# Patient Record
Sex: FEMALE | Race: WHITE | NOT HISPANIC OR LATINO
[De-identification: names, ages, dates, MRNs, and addresses within clinical notes are randomized per-mention and may not be internally consistent; named-entity substitution may affect disease eponyms.]

---

## 2017-04-05 ENCOUNTER — APPOINTMENT (OUTPATIENT)
Dept: OTOLARYNGOLOGY | Facility: CLINIC | Age: 67
End: 2017-04-05

## 2017-04-05 VITALS
DIASTOLIC BLOOD PRESSURE: 85 MMHG | HEIGHT: 62 IN | BODY MASS INDEX: 24.29 KG/M2 | SYSTOLIC BLOOD PRESSURE: 127 MMHG | WEIGHT: 132 LBS | HEART RATE: 75 BPM

## 2017-04-05 DIAGNOSIS — F45.8 OTHER SOMATOFORM DISORDERS: ICD-10-CM

## 2017-04-05 DIAGNOSIS — J35.1 HYPERTROPHY OF TONSILS: ICD-10-CM

## 2017-04-05 DIAGNOSIS — Z87.891 PERSONAL HISTORY OF NICOTINE DEPENDENCE: ICD-10-CM

## 2017-04-05 DIAGNOSIS — J06.9 ACUTE UPPER RESPIRATORY INFECTION, UNSPECIFIED: ICD-10-CM

## 2017-04-06 PROBLEM — J35.1 HYPERTROPHY OF LINGUAL TONSIL: Status: ACTIVE | Noted: 2017-04-06

## 2017-05-09 ENCOUNTER — RX RENEWAL (OUTPATIENT)
Age: 67
End: 2017-05-09

## 2017-12-01 ENCOUNTER — RX RENEWAL (OUTPATIENT)
Age: 67
End: 2017-12-01

## 2018-07-26 PROBLEM — F45.8 BRUXISM (TEETH GRINDING): Status: ACTIVE | Noted: 2017-04-05

## 2018-10-17 ENCOUNTER — APPOINTMENT (OUTPATIENT)
Dept: INTERNAL MEDICINE | Facility: CLINIC | Age: 68
End: 2018-10-17
Payer: MEDICARE

## 2018-10-17 VITALS — SYSTOLIC BLOOD PRESSURE: 118 MMHG | OXYGEN SATURATION: 98 % | HEART RATE: 84 BPM | DIASTOLIC BLOOD PRESSURE: 83 MMHG

## 2018-10-17 PROCEDURE — 99213 OFFICE O/P EST LOW 20 MIN: CPT

## 2018-10-17 RX ORDER — AMITRIPTYLINE HYDROCHLORIDE 25 MG/1
25 TABLET, FILM COATED ORAL
Refills: 0 | Status: DISCONTINUED | COMMUNITY
End: 2018-10-17

## 2018-10-17 RX ORDER — FAMOTIDINE 10 MG/1
TABLET, FILM COATED ORAL
Refills: 0 | Status: DISCONTINUED | COMMUNITY
End: 2018-10-17

## 2018-10-17 NOTE — HISTORY OF PRESENT ILLNESS
[FreeTextEntry1] : Severe anxiety;  Would like to come off Valium;  [de-identified] :  As outlined above; Also severe diaphoresis during sleep; Has been present over two decades; Recent mammogram negative. \par Does not want Physical done;

## 2019-01-04 ENCOUNTER — APPOINTMENT (OUTPATIENT)
Dept: INTERNAL MEDICINE | Facility: CLINIC | Age: 69
End: 2019-01-04

## 2019-01-04 ENCOUNTER — APPOINTMENT (OUTPATIENT)
Dept: INTERNAL MEDICINE | Facility: CLINIC | Age: 69
End: 2019-01-04
Payer: MEDICARE

## 2019-01-04 VITALS
BODY MASS INDEX: 24.84 KG/M2 | HEART RATE: 77 BPM | OXYGEN SATURATION: 97 % | SYSTOLIC BLOOD PRESSURE: 133 MMHG | TEMPERATURE: 99.2 F | WEIGHT: 135 LBS | DIASTOLIC BLOOD PRESSURE: 86 MMHG | HEIGHT: 62 IN

## 2019-01-04 DIAGNOSIS — K21.0 GASTRO-ESOPHAGEAL REFLUX DISEASE WITH ESOPHAGITIS: ICD-10-CM

## 2019-01-04 DIAGNOSIS — R42 DIZZINESS AND GIDDINESS: ICD-10-CM

## 2019-01-04 PROCEDURE — 99213 OFFICE O/P EST LOW 20 MIN: CPT | Mod: 25

## 2019-01-04 PROCEDURE — 36415 COLL VENOUS BLD VENIPUNCTURE: CPT

## 2019-01-04 NOTE — ASSESSMENT
[FreeTextEntry1] : Unclear reason for lightheadedness. Will obtain blood work and EKG; No change in medications for now. There is a tremendous component of anxiety in her symptoms which she understands. Will review blood work and make further recommendations

## 2019-01-06 LAB
25(OH)D3 SERPL-MCNC: 72 NG/ML
ALBUMIN SERPL ELPH-MCNC: 4.4 G/DL
ALP BLD-CCNC: 52 U/L
ALT SERPL-CCNC: 16 U/L
ANION GAP SERPL CALC-SCNC: 12 MMOL/L
APPEARANCE: CLEAR
AST SERPL-CCNC: 30 U/L
BACTERIA: NEGATIVE
BASOPHILS # BLD AUTO: 0.05 K/UL
BASOPHILS NFR BLD AUTO: 0.8 %
BILIRUB SERPL-MCNC: 0.5 MG/DL
BILIRUBIN URINE: NEGATIVE
BLOOD URINE: NEGATIVE
BUN SERPL-MCNC: 12 MG/DL
CALCIUM SERPL-MCNC: 9.3 MG/DL
CHLORIDE SERPL-SCNC: 104 MMOL/L
CHOLEST SERPL-MCNC: 240 MG/DL
CHOLEST/HDLC SERPL: 3 RATIO
CO2 SERPL-SCNC: 23 MMOL/L
COLOR: ABNORMAL
CREAT SERPL-MCNC: 0.84 MG/DL
EOSINOPHIL # BLD AUTO: 0.21 K/UL
EOSINOPHIL NFR BLD AUTO: 3.2 %
GLUCOSE QUALITATIVE U: NEGATIVE MG/DL
GLUCOSE SERPL-MCNC: 82 MG/DL
HBA1C MFR BLD HPLC: 5.4 %
HCT VFR BLD CALC: 35.6 %
HDLC SERPL-MCNC: 80 MG/DL
HGB BLD-MCNC: 11.6 G/DL
HYALINE CASTS: 2 /LPF
IMM GRANULOCYTES NFR BLD AUTO: 0 %
KETONES URINE: NEGATIVE
LDLC SERPL CALC-MCNC: 144 MG/DL
LEUKOCYTE ESTERASE URINE: NEGATIVE
LYMPHOCYTES # BLD AUTO: 2.18 K/UL
LYMPHOCYTES NFR BLD AUTO: 33.4 %
MAN DIFF?: NORMAL
MCHC RBC-ENTMCNC: 32.6 GM/DL
MCHC RBC-ENTMCNC: 33 PG
MCV RBC AUTO: 101.4 FL
MICROSCOPIC-UA: NORMAL
MONOCYTES # BLD AUTO: 0.49 K/UL
MONOCYTES NFR BLD AUTO: 7.5 %
NEUTROPHILS # BLD AUTO: 3.6 K/UL
NEUTROPHILS NFR BLD AUTO: 55.1 %
NITRITE URINE: NEGATIVE
PH URINE: 6.5
PLATELET # BLD AUTO: 265 K/UL
POTASSIUM SERPL-SCNC: 4.7 MMOL/L
PROT SERPL-MCNC: 7 G/DL
PROTEIN URINE: NEGATIVE MG/DL
RBC # BLD: 3.51 M/UL
RBC # FLD: 15 %
RED BLOOD CELLS URINE: 11 /HPF
SODIUM SERPL-SCNC: 139 MMOL/L
SPECIFIC GRAVITY URINE: 1.02
SQUAMOUS EPITHELIAL CELLS: 6 /HPF
T4 FREE SERPL-MCNC: 0.9 NG/DL
TRIGL SERPL-MCNC: 79 MG/DL
TSH SERPL-ACNC: 2.02 UIU/ML
UROBILINOGEN URINE: NEGATIVE MG/DL
WBC # FLD AUTO: 6.53 K/UL
WHITE BLOOD CELLS URINE: 1 /HPF

## 2019-05-08 ENCOUNTER — APPOINTMENT (OUTPATIENT)
Dept: INTERNAL MEDICINE | Facility: CLINIC | Age: 69
End: 2019-05-08
Payer: MEDICARE

## 2019-05-08 VITALS
WEIGHT: 135 LBS | HEART RATE: 83 BPM | BODY MASS INDEX: 24.84 KG/M2 | SYSTOLIC BLOOD PRESSURE: 114 MMHG | HEIGHT: 62 IN | OXYGEN SATURATION: 97 % | TEMPERATURE: 98.7 F | DIASTOLIC BLOOD PRESSURE: 77 MMHG

## 2019-05-08 DIAGNOSIS — G47.00 INSOMNIA, UNSPECIFIED: ICD-10-CM

## 2019-05-08 PROCEDURE — 36415 COLL VENOUS BLD VENIPUNCTURE: CPT

## 2019-05-08 PROCEDURE — 99213 OFFICE O/P EST LOW 20 MIN: CPT | Mod: 25

## 2019-05-08 NOTE — HISTORY OF PRESENT ILLNESS
[de-identified] : As above will repeat lipid profile today; Still with diaphoresis at night; Being followed By Dr. Rajiv Jenkins  re hand problems; [FreeTextEntry1] : Labs from last visit; Increased lipids;

## 2019-05-08 NOTE — PHYSICAL EXAM
[Well Nourished] : well nourished [No Acute Distress] : no acute distress [Well Developed] : well developed [Well-Appearing] : well-appearing [Normal Sclera/Conjunctiva] : normal sclera/conjunctiva [PERRL] : pupils equal round and reactive to light [EOMI] : extraocular movements intact [Normal Outer Ear/Nose] : the outer ears and nose were normal in appearance [Normal Oropharynx] : the oropharynx was normal [No JVD] : no jugular venous distention [No Lymphadenopathy] : no lymphadenopathy [Supple] : supple [Thyroid Normal, No Nodules] : the thyroid was normal and there were no nodules present [No Respiratory Distress] : no respiratory distress  [Clear to Auscultation] : lungs were clear to auscultation bilaterally [Normal Rate] : normal rate  [No Accessory Muscle Use] : no accessory muscle use [Regular Rhythm] : with a regular rhythm [Normal S1, S2] : normal S1 and S2 [No Murmur] : no murmur heard [No Carotid Bruits] : no carotid bruits [No Abdominal Bruit] : a ~M bruit was not heard ~T in the abdomen [No Varicosities] : no varicosities [No Edema] : there was no peripheral edema [Pedal Pulses Present] : the pedal pulses are present [Soft] : abdomen soft [No Extremity Clubbing/Cyanosis] : no extremity clubbing/cyanosis [No Palpable Aorta] : no palpable aorta [Non-distended] : non-distended [No Masses] : no abdominal mass palpated [Non Tender] : non-tender [Normal Posterior Cervical Nodes] : no posterior cervical lymphadenopathy [Normal Bowel Sounds] : normal bowel sounds [No HSM] : no HSM [No Spinal Tenderness] : no spinal tenderness [Normal Anterior Cervical Nodes] : no anterior cervical lymphadenopathy [No CVA Tenderness] : no CVA  tenderness [No Rash] : no rash [No Joint Swelling] : no joint swelling [Grossly Normal Strength/Tone] : grossly normal strength/tone [Normal Gait] : normal gait [No Focal Deficits] : no focal deficits [Coordination Grossly Intact] : coordination grossly intact [Deep Tendon Reflexes (DTR)] : deep tendon reflexes were 2+ and symmetric [Normal Insight/Judgement] : insight and judgment were intact [Normal Affect] : the affect was normal

## 2019-05-08 NOTE — HEALTH RISK ASSESSMENT
[No falls in past year] : Patient reported no falls in the past year [0] : 2) Feeling down, depressed, or hopeless: Not at all (0) [[id number]] : 0 [] : No

## 2019-05-11 LAB
CHOLEST SERPL-MCNC: 225 MG/DL
CHOLEST/HDLC SERPL: 2.9 RATIO
HDLC SERPL-MCNC: 79 MG/DL
LDLC SERPL CALC-MCNC: 132 MG/DL
TRIGL SERPL-MCNC: 71 MG/DL

## 2019-05-22 ENCOUNTER — APPOINTMENT (OUTPATIENT)
Dept: INTERNAL MEDICINE | Facility: CLINIC | Age: 69
End: 2019-05-22
Payer: MEDICARE

## 2019-05-22 VITALS
SYSTOLIC BLOOD PRESSURE: 127 MMHG | TEMPERATURE: 98.7 F | WEIGHT: 135 LBS | OXYGEN SATURATION: 97 % | BODY MASS INDEX: 24.84 KG/M2 | HEIGHT: 62 IN | HEART RATE: 78 BPM | DIASTOLIC BLOOD PRESSURE: 85 MMHG

## 2019-05-22 DIAGNOSIS — F32.9 MAJOR DEPRESSIVE DISORDER, SINGLE EPISODE, UNSPECIFIED: ICD-10-CM

## 2019-05-22 PROCEDURE — 99213 OFFICE O/P EST LOW 20 MIN: CPT | Mod: 25

## 2019-05-22 PROCEDURE — 36415 COLL VENOUS BLD VENIPUNCTURE: CPT

## 2019-05-22 RX ORDER — PANTOPRAZOLE 40 MG/1
40 TABLET, DELAYED RELEASE ORAL
Refills: 0 | Status: DISCONTINUED | COMMUNITY
End: 2019-05-22

## 2019-05-22 RX ORDER — OXYCODONE 5 MG/1
5 TABLET ORAL
Qty: 20 | Refills: 0 | Status: ACTIVE | COMMUNITY
Start: 2019-04-22

## 2019-05-22 RX ORDER — CLINDAMYCIN HYDROCHLORIDE 150 MG/1
150 CAPSULE ORAL
Qty: 20 | Refills: 0 | Status: ACTIVE | COMMUNITY
Start: 2019-05-14

## 2019-05-22 NOTE — HISTORY OF PRESENT ILLNESS
[No Pertinent Cardiac History] : no history of aortic stenosis, atrial fibrillation, coronary artery disease, recent myocardial infarction, or implantable device/pacemaker [No Pertinent Pulmonary History] : no history of asthma, COPD, sleep apnea, or smoking [Chronic Anticoagulation] : no chronic anticoagulation [Chronic Kidney Disease] : no chronic kidney disease [Diabetes] : no diabetes [FreeTextEntry1] : Hand Sera [FreeTextEntry2] : June 13 2019 [FreeTextEntry3] : Dr. Jenkins [FreeTextEntry4] : Needs Hand Surgery

## 2019-05-23 ENCOUNTER — TRANSCRIPTION ENCOUNTER (OUTPATIENT)
Age: 69
End: 2019-05-23

## 2019-05-23 LAB
ALBUMIN SERPL ELPH-MCNC: 4.8 G/DL
ALP BLD-CCNC: 56 U/L
ALT SERPL-CCNC: 17 U/L
ANION GAP SERPL CALC-SCNC: 13 MMOL/L
APPEARANCE: CLEAR
APTT BLD: 29 SEC
AST SERPL-CCNC: 28 U/L
BACTERIA: NEGATIVE
BASOPHILS # BLD AUTO: 0.07 K/UL
BASOPHILS NFR BLD AUTO: 1 %
BILIRUB SERPL-MCNC: 0.4 MG/DL
BILIRUBIN URINE: NEGATIVE
BLOOD URINE: ABNORMAL
BUN SERPL-MCNC: 11 MG/DL
CALCIUM SERPL-MCNC: 9.4 MG/DL
CHLORIDE SERPL-SCNC: 104 MMOL/L
CO2 SERPL-SCNC: 25 MMOL/L
COLOR: YELLOW
CREAT SERPL-MCNC: 0.85 MG/DL
EOSINOPHIL # BLD AUTO: 0.18 K/UL
EOSINOPHIL NFR BLD AUTO: 2.5 %
GLUCOSE QUALITATIVE U: NEGATIVE
GLUCOSE SERPL-MCNC: 80 MG/DL
HCT VFR BLD CALC: 32.3 %
HGB BLD-MCNC: 11 G/DL
HYALINE CASTS: 0 /LPF
IMM GRANULOCYTES NFR BLD AUTO: 0.1 %
INR PPP: 0.9 RATIO
KETONES URINE: NEGATIVE
LEUKOCYTE ESTERASE URINE: NEGATIVE
LYMPHOCYTES # BLD AUTO: 2.38 K/UL
LYMPHOCYTES NFR BLD AUTO: 33.7 %
MAN DIFF?: NORMAL
MCHC RBC-ENTMCNC: 33.5 PG
MCHC RBC-ENTMCNC: 34.1 GM/DL
MCV RBC AUTO: 98.5 FL
MICROSCOPIC-UA: NORMAL
MONOCYTES # BLD AUTO: 0.57 K/UL
MONOCYTES NFR BLD AUTO: 8.1 %
NEUTROPHILS # BLD AUTO: 3.86 K/UL
NEUTROPHILS NFR BLD AUTO: 54.6 %
NITRITE URINE: NEGATIVE
PH URINE: 6
PLATELET # BLD AUTO: 242 K/UL
POTASSIUM SERPL-SCNC: 4.4 MMOL/L
PROT SERPL-MCNC: 7 G/DL
PROTEIN URINE: NEGATIVE
PT BLD: 10.3 SEC
RBC # BLD: 3.28 M/UL
RBC # FLD: 13.1 %
RED BLOOD CELLS URINE: 1 /HPF
SODIUM SERPL-SCNC: 142 MMOL/L
SPECIFIC GRAVITY URINE: 1.01
SQUAMOUS EPITHELIAL CELLS: 1 /HPF
UROBILINOGEN URINE: NORMAL
WBC # FLD AUTO: 7.07 K/UL
WHITE BLOOD CELLS URINE: 1 /HPF

## 2019-06-21 ENCOUNTER — APPOINTMENT (OUTPATIENT)
Dept: INTERNAL MEDICINE | Facility: CLINIC | Age: 69
End: 2019-06-21
Payer: MEDICARE

## 2019-06-21 VITALS
HEART RATE: 80 BPM | DIASTOLIC BLOOD PRESSURE: 77 MMHG | HEIGHT: 62 IN | OXYGEN SATURATION: 98 % | SYSTOLIC BLOOD PRESSURE: 164 MMHG | BODY MASS INDEX: 24.84 KG/M2 | TEMPERATURE: 98.8 F | WEIGHT: 135 LBS

## 2019-06-21 PROCEDURE — 99213 OFFICE O/P EST LOW 20 MIN: CPT

## 2019-06-21 RX ORDER — CYCLOBENZAPRINE HYDROCHLORIDE 10 MG/1
10 TABLET, FILM COATED ORAL
Qty: 60 | Refills: 3 | Status: ACTIVE | COMMUNITY
Start: 2019-06-21 | End: 1900-01-01

## 2019-06-21 NOTE — ASSESSMENT
[FreeTextEntry1] : Severe pain in neck; Likely cervical spinal cord disease; Have ordered oxycodone and Flexeril; If no  improvement then ED visit, Also ordered cervical collar

## 2019-06-21 NOTE — HISTORY OF PRESENT ILLNESS
[FreeTextEntry1] : Chief complaint neck pain [de-identified] : Unable to move head secondary to pain in the neck; Unable to walk secondary to this neck pain

## 2019-06-22 ENCOUNTER — INPATIENT (INPATIENT)
Facility: HOSPITAL | Age: 69
LOS: 1 days | Discharge: HOME | DRG: 552 | End: 2019-06-24
Attending: INTERNAL MEDICINE | Admitting: INTERNAL MEDICINE
Payer: MEDICARE

## 2019-06-22 VITALS
SYSTOLIC BLOOD PRESSURE: 134 MMHG | WEIGHT: 134.92 LBS | TEMPERATURE: 102 F | RESPIRATION RATE: 18 BRPM | OXYGEN SATURATION: 94 % | DIASTOLIC BLOOD PRESSURE: 87 MMHG | HEART RATE: 98 BPM

## 2019-06-22 DIAGNOSIS — Z98.890 OTHER SPECIFIED POSTPROCEDURAL STATES: Chronic | ICD-10-CM

## 2019-06-22 DIAGNOSIS — Z91.89 OTHER SPECIFIED PERSONAL RISK FACTORS, NOT ELSEWHERE CLASSIFIED: ICD-10-CM

## 2019-06-22 DIAGNOSIS — Z96.651 PRESENCE OF RIGHT ARTIFICIAL KNEE JOINT: Chronic | ICD-10-CM

## 2019-06-22 DIAGNOSIS — M54.2 CERVICALGIA: ICD-10-CM

## 2019-06-22 DIAGNOSIS — R63.8 OTHER SYMPTOMS AND SIGNS CONCERNING FOOD AND FLUID INTAKE: ICD-10-CM

## 2019-06-22 DIAGNOSIS — R65.10 SYSTEMIC INFLAMMATORY RESPONSE SYNDROME (SIRS) OF NON-INFECTIOUS ORIGIN WITHOUT ACUTE ORGAN DYSFUNCTION: ICD-10-CM

## 2019-06-22 DIAGNOSIS — R82.71 BACTERIURIA: ICD-10-CM

## 2019-06-22 DIAGNOSIS — F41.9 ANXIETY DISORDER, UNSPECIFIED: ICD-10-CM

## 2019-06-22 DIAGNOSIS — Z96.652 PRESENCE OF LEFT ARTIFICIAL KNEE JOINT: Chronic | ICD-10-CM

## 2019-06-22 DIAGNOSIS — E78.5 HYPERLIPIDEMIA, UNSPECIFIED: ICD-10-CM

## 2019-06-22 LAB
ALBUMIN SERPL ELPH-MCNC: 4.6 G/DL — SIGNIFICANT CHANGE UP (ref 3.3–5)
ALP SERPL-CCNC: 58 U/L — SIGNIFICANT CHANGE UP (ref 40–120)
ALT FLD-CCNC: 26 U/L — SIGNIFICANT CHANGE UP (ref 10–45)
ANION GAP SERPL CALC-SCNC: 9 MMOL/L — SIGNIFICANT CHANGE UP (ref 5–17)
APPEARANCE UR: CLEAR — SIGNIFICANT CHANGE UP
AST SERPL-CCNC: 32 U/L — SIGNIFICANT CHANGE UP (ref 10–40)
BASOPHILS # BLD AUTO: 0.04 K/UL — SIGNIFICANT CHANGE UP (ref 0–0.2)
BASOPHILS NFR BLD AUTO: 0.4 % — SIGNIFICANT CHANGE UP (ref 0–2)
BILIRUB SERPL-MCNC: 0.8 MG/DL — SIGNIFICANT CHANGE UP (ref 0.2–1.2)
BILIRUB UR-MCNC: NEGATIVE — SIGNIFICANT CHANGE UP
BUN SERPL-MCNC: 10 MG/DL — SIGNIFICANT CHANGE UP (ref 7–23)
CALCIUM SERPL-MCNC: 9.7 MG/DL — SIGNIFICANT CHANGE UP (ref 8.4–10.5)
CHLORIDE SERPL-SCNC: 99 MMOL/L — SIGNIFICANT CHANGE UP (ref 96–108)
CO2 SERPL-SCNC: 27 MMOL/L — SIGNIFICANT CHANGE UP (ref 22–31)
COLOR SPEC: YELLOW — SIGNIFICANT CHANGE UP
CREAT SERPL-MCNC: 0.79 MG/DL — SIGNIFICANT CHANGE UP (ref 0.5–1.3)
DIFF PNL FLD: ABNORMAL
EOSINOPHIL # BLD AUTO: 0.05 K/UL — SIGNIFICANT CHANGE UP (ref 0–0.5)
EOSINOPHIL NFR BLD AUTO: 0.5 % — SIGNIFICANT CHANGE UP (ref 0–6)
EXTRA BLUE TOP TUBE: SIGNIFICANT CHANGE UP
EXTRA SST TUBE: SIGNIFICANT CHANGE UP
GAS PNL BLDV: SIGNIFICANT CHANGE UP
GLUCOSE SERPL-MCNC: 113 MG/DL — HIGH (ref 70–99)
GLUCOSE UR QL: NEGATIVE — SIGNIFICANT CHANGE UP
HCT VFR BLD CALC: 33.6 % — LOW (ref 34.5–45)
HGB BLD-MCNC: 11.3 G/DL — LOW (ref 11.5–15.5)
IMM GRANULOCYTES NFR BLD AUTO: 0.3 % — SIGNIFICANT CHANGE UP (ref 0–1.5)
KETONES UR-MCNC: NEGATIVE — SIGNIFICANT CHANGE UP
LACTATE SERPL-SCNC: 0.7 MMOL/L — SIGNIFICANT CHANGE UP (ref 0.5–2)
LEUKOCYTE ESTERASE UR-ACNC: ABNORMAL
LYMPHOCYTES # BLD AUTO: 1.98 K/UL — SIGNIFICANT CHANGE UP (ref 1–3.3)
LYMPHOCYTES # BLD AUTO: 18 % — SIGNIFICANT CHANGE UP (ref 13–44)
MCHC RBC-ENTMCNC: 33.6 GM/DL — SIGNIFICANT CHANGE UP (ref 32–36)
MCHC RBC-ENTMCNC: 33.9 PG — SIGNIFICANT CHANGE UP (ref 27–34)
MCV RBC AUTO: 100.9 FL — HIGH (ref 80–100)
MONOCYTES # BLD AUTO: 1.25 K/UL — HIGH (ref 0–0.9)
MONOCYTES NFR BLD AUTO: 11.4 % — SIGNIFICANT CHANGE UP (ref 2–14)
NEUTROPHILS # BLD AUTO: 7.66 K/UL — HIGH (ref 1.8–7.4)
NEUTROPHILS NFR BLD AUTO: 69.4 % — SIGNIFICANT CHANGE UP (ref 43–77)
NITRITE UR-MCNC: NEGATIVE — SIGNIFICANT CHANGE UP
NRBC # BLD: 0 /100 WBCS — SIGNIFICANT CHANGE UP (ref 0–0)
PCP SPEC-MCNC: SIGNIFICANT CHANGE UP
PH UR: 6 — SIGNIFICANT CHANGE UP (ref 5–8)
PLATELET # BLD AUTO: 199 K/UL — SIGNIFICANT CHANGE UP (ref 150–400)
POTASSIUM SERPL-MCNC: 4.4 MMOL/L — SIGNIFICANT CHANGE UP (ref 3.5–5.3)
POTASSIUM SERPL-SCNC: 4.4 MMOL/L — SIGNIFICANT CHANGE UP (ref 3.5–5.3)
PROT SERPL-MCNC: 7.9 G/DL — SIGNIFICANT CHANGE UP (ref 6–8.3)
PROT UR-MCNC: 30 MG/DL
RAPID RVP RESULT: SIGNIFICANT CHANGE UP
RBC # BLD: 3.33 M/UL — LOW (ref 3.8–5.2)
RBC # FLD: 13 % — SIGNIFICANT CHANGE UP (ref 10.3–14.5)
SODIUM SERPL-SCNC: 135 MMOL/L — SIGNIFICANT CHANGE UP (ref 135–145)
SP GR SPEC: 1.01 — SIGNIFICANT CHANGE UP (ref 1–1.03)
UROBILINOGEN FLD QL: 0.2 E.U./DL — SIGNIFICANT CHANGE UP
WBC # BLD: 11.01 K/UL — HIGH (ref 3.8–10.5)
WBC # FLD AUTO: 11.01 K/UL — HIGH (ref 3.8–10.5)

## 2019-06-22 PROCEDURE — 99222 1ST HOSP IP/OBS MODERATE 55: CPT | Mod: GC

## 2019-06-22 PROCEDURE — 71045 X-RAY EXAM CHEST 1 VIEW: CPT | Mod: 26

## 2019-06-22 PROCEDURE — 72125 CT NECK SPINE W/O DYE: CPT | Mod: 26

## 2019-06-22 PROCEDURE — 99285 EMERGENCY DEPT VISIT HI MDM: CPT

## 2019-06-22 RX ORDER — CYCLOBENZAPRINE HYDROCHLORIDE 10 MG/1
1 TABLET, FILM COATED ORAL
Qty: 0 | Refills: 0 | DISCHARGE

## 2019-06-22 RX ORDER — CEFTRIAXONE 500 MG/1
1000 INJECTION, POWDER, FOR SOLUTION INTRAMUSCULAR; INTRAVENOUS ONCE
Refills: 0 | Status: COMPLETED | OUTPATIENT
Start: 2019-06-22 | End: 2019-06-22

## 2019-06-22 RX ORDER — DIAZEPAM 5 MG
5 TABLET ORAL EVERY 8 HOURS
Refills: 0 | Status: DISCONTINUED | OUTPATIENT
Start: 2019-06-22 | End: 2019-06-22

## 2019-06-22 RX ORDER — ACETAMINOPHEN 500 MG
1000 TABLET ORAL ONCE
Refills: 0 | Status: COMPLETED | OUTPATIENT
Start: 2019-06-22 | End: 2019-06-22

## 2019-06-22 RX ORDER — MORPHINE SULFATE 50 MG/1
4 CAPSULE, EXTENDED RELEASE ORAL ONCE
Refills: 0 | Status: DISCONTINUED | OUTPATIENT
Start: 2019-06-22 | End: 2019-06-22

## 2019-06-22 RX ORDER — SIMVASTATIN 20 MG/1
1 TABLET, FILM COATED ORAL
Qty: 0 | Refills: 0 | DISCHARGE

## 2019-06-22 RX ORDER — SODIUM CHLORIDE 9 MG/ML
1000 INJECTION INTRAMUSCULAR; INTRAVENOUS; SUBCUTANEOUS ONCE
Refills: 0 | Status: COMPLETED | OUTPATIENT
Start: 2019-06-22 | End: 2019-06-22

## 2019-06-22 RX ORDER — DIAZEPAM 5 MG
5 TABLET ORAL ONCE
Refills: 0 | Status: DISCONTINUED | OUTPATIENT
Start: 2019-06-22 | End: 2019-06-22

## 2019-06-22 RX ORDER — HYDROMORPHONE HYDROCHLORIDE 2 MG/ML
1 INJECTION INTRAMUSCULAR; INTRAVENOUS; SUBCUTANEOUS EVERY 4 HOURS
Refills: 0 | Status: DISCONTINUED | OUTPATIENT
Start: 2019-06-22 | End: 2019-06-24

## 2019-06-22 RX ORDER — SIMVASTATIN 20 MG/1
20 TABLET, FILM COATED ORAL AT BEDTIME
Refills: 0 | Status: DISCONTINUED | OUTPATIENT
Start: 2019-06-22 | End: 2019-06-24

## 2019-06-22 RX ORDER — ENOXAPARIN SODIUM 100 MG/ML
40 INJECTION SUBCUTANEOUS EVERY 24 HOURS
Refills: 0 | Status: DISCONTINUED | OUTPATIENT
Start: 2019-06-22 | End: 2019-06-24

## 2019-06-22 RX ORDER — SIMVASTATIN 20 MG/1
0 TABLET, FILM COATED ORAL
Qty: 0 | Refills: 0 | DISCHARGE

## 2019-06-22 RX ADMIN — Medication 1000 MILLIGRAM(S): at 14:12

## 2019-06-22 RX ADMIN — CEFTRIAXONE 1000 MILLIGRAM(S): 500 INJECTION, POWDER, FOR SOLUTION INTRAMUSCULAR; INTRAVENOUS at 16:21

## 2019-06-22 RX ADMIN — ENOXAPARIN SODIUM 40 MILLIGRAM(S): 100 INJECTION SUBCUTANEOUS at 19:40

## 2019-06-22 RX ADMIN — SODIUM CHLORIDE 1000 MILLILITER(S): 9 INJECTION INTRAMUSCULAR; INTRAVENOUS; SUBCUTANEOUS at 14:12

## 2019-06-22 RX ADMIN — MORPHINE SULFATE 4 MILLIGRAM(S): 50 CAPSULE, EXTENDED RELEASE ORAL at 17:01

## 2019-06-22 RX ADMIN — Medication 1000 MILLIGRAM(S): at 00:00

## 2019-06-22 RX ADMIN — MORPHINE SULFATE 4 MILLIGRAM(S): 50 CAPSULE, EXTENDED RELEASE ORAL at 14:12

## 2019-06-22 RX ADMIN — SIMVASTATIN 20 MILLIGRAM(S): 20 TABLET, FILM COATED ORAL at 22:00

## 2019-06-22 RX ADMIN — SODIUM CHLORIDE 1000 MILLILITER(S): 9 INJECTION INTRAMUSCULAR; INTRAVENOUS; SUBCUTANEOUS at 14:33

## 2019-06-22 RX ADMIN — MORPHINE SULFATE 4 MILLIGRAM(S): 50 CAPSULE, EXTENDED RELEASE ORAL at 16:37

## 2019-06-22 RX ADMIN — MORPHINE SULFATE 4 MILLIGRAM(S): 50 CAPSULE, EXTENDED RELEASE ORAL at 15:12

## 2019-06-22 RX ADMIN — Medication 5 MILLIGRAM(S): at 14:12

## 2019-06-22 NOTE — H&P ADULT - PROBLEM SELECTOR PLAN 3
-pain control  -PT/OT   - DDx: migraine, strain/sprain,   CT spine showing spinal stenosis c5-c6 and c6-C7, 3mm anterolisthesis C7 on T1 which is not significantly changed from prior, osteoporosis   -pain control: dilaudid 1mg q4hrs PRN, valium IV 5mg q8hrs  -soft tissue techniques to neck  -PT/OT DDx: migraine, strain/sprain,   CT spine showing spinal stenosis c5-c6 and c6-C7, 3mm anterolisthesis C7 on T1 which is not significantly changed from prior, osteoporosis   -pain control: dilaudid 1mg q4hrs PRN  -soft tissue techniques to neck  -PT/OT UA showing bacteria, LE, protein 30, negative nitrite  However, asymptomatic, therefore not needing any antibiotics at this time

## 2019-06-22 NOTE — ED PROVIDER NOTE - MUSCULOSKELETAL, MLM
Spine appears normal, there is no midline cervical, thoracic or lumbar spine pain/tenderness/stepoff/deformity.  + ttp over cervical and upper thoracic paraspinal musculature.  ROM at neck intact with pain.  range of motion is not limited, no muscle or joint tenderness

## 2019-06-22 NOTE — H&P ADULT - PROBLEM SELECTOR PLAN 1
MET 2 SIRS POA: 101.7, HR 93. Unknown source of inflammatory response. Low concern for infection; however, given neck pain and fever will need to rule out meningitis. Other differentials include MSK (cervical strain/sprain), spinal stenosis, less likely MI/ACS, carotid artery dissection MET 2 SIRS POA: 101.7 oral, HR 93. Unknown source of fever. However, low concern for infection (ie. meningitis) other differentials include MSK (cervical strain/sprain), discitis, spinal stenosis, less likely MI/ACS, carotid artery dissection  s/p 1g ceftriaxone in ED. Low concern for meningitis.  -neurology consult in AM  -RVP negative   -consider MRI neck to r/o discitis, cervical radiculopathy  -f/u urine Cx, blood cx  -PT/OT   -physical medicine and rehab referral

## 2019-06-22 NOTE — H&P ADULT - PROBLEM SELECTOR PLAN 4
-pain control PRN  -see above plan UA showing bacteria, LE, protein 30, negative nitrite  However, asymptomatic, therefore not needing any antibiotics at this time -pain control PRN  -ativan 0.5mg IV PRN  -see above plan

## 2019-06-22 NOTE — ED ADULT TRIAGE NOTE - OTHER COMPLAINTS
denies any fevers.  Saw MD sanchez who told her its a pinched nerve. Patient has a neck-collar on. Noted to have fever in triage. placed a face mask on patient.

## 2019-06-22 NOTE — H&P ADULT - PROBLEM SELECTOR PLAN 6
1) PCP Contacted on Admission: (Y/N) --> Name & Phone #: Dr. Samano  2) Date of Contact with PCP: 6/22  3) PCP Contacted at Discharge: (Y/N)  4) Summary of Handoff Given to PCP:   5) Post-Discharge Appointment Date and Location: Fluids: None  Electrolytes: replete as necessary, K>4, Mg>2   Nutrition: TLC DASH  Bowel Regimen: none  DVT ppx: lovenox  Code: Full  Disposition: [facility] Takes home simvastatin 20mg. Therapeutic interchange with atorvastatin Fluids: None  Electrolytes: replete as necessary, K>4, Mg>2   Nutrition: TLC DASH  Bowel Regimen: none  DVT ppx: lovenox  Code: Full  Disposition: [facility]

## 2019-06-22 NOTE — H&P ADULT - NSICDXPASTSURGICALHX_GEN_ALL_CORE_FT
PAST SURGICAL HISTORY:  History of total left knee replacement (TKR) 2015    S/P lumbar laminectomy 1991

## 2019-06-22 NOTE — H&P ADULT - PROBLEM SELECTOR PLAN 8
1) PCP Contacted on Admission: (Y/N) --> Name & Phone #: Dr. Samano  2) Date of Contact with PCP: 6/22  3) PCP Contacted at Discharge: (Y/N)  4) Summary of Handoff Given to PCP:   5) Post-Discharge Appointment Date and Location: good, to achieve stated therapy goals

## 2019-06-22 NOTE — H&P ADULT - NSHPLABSRESULTS_GEN_ALL_CORE
LABS:                        11.3   11.01 )-----------( 199      ( 2019 13:35 )             33.6         135  |  99  |  10  ----------------------------<  113<H>  4.4   |  27  |  0.79    Ca    9.7      2019 13:35    TPro  7.9  /  Alb  4.6  /  TBili  0.8  /  DBili  x   /  AST  32  /  ALT  26  /  AlkPhos  58        Urinalysis Basic - ( 2019 14:50 )    Color: Yellow / Appearance: Clear / S.015 / pH: x  Gluc: x / Ketone: NEGATIVE  / Bili: Negative / Urobili: 0.2 E.U./dL   Blood: x / Protein: 30 mg/dL / Nitrite: NEGATIVE   Leuk Esterase: Trace / RBC: 5-10 /HPF / WBC > 10 /HPF   Sq Epi: x / Non Sq Epi: Moderate /HPF / Bacteria: Present /HPF      EXAM:  CT CERVICAL SPINE                          PROCEDURE DATE:  2019          INTERPRETATION:  PROCEDURE: CT Cervical spine without contrast    INDICATION: Neck pain    TECHNIQUE: Multiple axial sections were obtained from the mid orbits to   the sternoclavicular joint. Sagittal and coronal reformats were obtained   from the axial data set. The images were reviewed in soft tissue and bone   windows.    COMPARISON: CT neck soft tissues 2015    FINDINGS: There is loss of the normal cervical lordosis which may be   secondary to positioning.There is no facet subluxation. There is a 3 mm   anterolisthesis of C7 on T1 which is not significantly changed from   prior. There is minimal levoscoliosis.     The osseous structures are intact without fracture. Vertebral body   heights are preserved however there is significant vertebral body   sclerosis and marginal osteophytosis of C3-T1.     There is multilevel intervertebral disc space loss from C3 to T1 with   near complete fusion of C6-C7.    The prevertebral soft tissues are within normal limits.     At the C2/3 level, there is no disc herniation. There is no central   spinal canal stenosis or neural foramen narrowing.    At the C3/4 level, there is a small posterior disc osteophyte complex as   well as bilateral uncovertebral and facet hypertrophy causing mild   bilateral neural foraminal narrowing. There is no spinal canal stenosis.    At the C4/5 level, there is no disc herniation. There is bilateral   uncovertebral and facet hypertrophy causing mild to moderate bilateral   neural foraminal narrowing. There is no spinal canal stenosis.    At the C5/6 level, there is a small posterior disc osteophyte complex as   well as bilateral uncovertebral and facet hypertrophy causing moderate   left and mild right neural foraminal narrowing. There is mild spinal   canal stenosis.    At the C6/7 level, there is bilateral uncovertebral and facet hypertrophy   causing moderate bilateral neural foraminal narrowing. There is mild   spinal canal stenosis.    At the C7/T1 level, there is mild disc uncovering. There is no central   spinal canal stenosis or neural foramen narrowing.      IMPRESSION:   1. There is no acute fracture.    2. Discogenic degenerative changes of the cervical spine as described   above including spinal canal stenosis at C5-C6 and C6-C7. 3 mm   anterolisthesis of C7 on T1 which is not significantly changed from prior.    3. Multilevel osteoarthrosis as described above.    EKG: NSR, low qrs voltage

## 2019-06-22 NOTE — H&P ADULT - ASSESSMENT
67yo F PMHx Anxiety presents with 3 days of neck pain/stiffness which did not improve with muscle relaxant and pain regimen, found to have fever, admitted for meningitis rule out 67yo F PMHx Anxiety presents with 3 days of neck pain/stiffness which did not improve with muscle relaxant and pain regimen, admitted for further workup of neck pain in setting of SIRS

## 2019-06-22 NOTE — H&P ADULT - PROBLEM SELECTOR PLAN 2
See above plan. s/p 1g ceftriaxone in ED. Low concern for meningitis but will empirically treat  -neurology consult in AM  -CT head wo contrast  -droplet precautions  -possible pt will need lumbar puncture   -PT/OT   -PMR referral See above plan. s/p 1g ceftriaxone in ED. Low concern for meningitis.  -neurology consult in AM  -RVP negative   -droplet precautions  -possible pt will need lumbar puncture for meningitis rule out   -PT/OT   -PMR referral DDx: migraine, strain/sprain,   CT spine showing spinal stenosis c5-c6 and c6-C7, 3mm anterolisthesis C7 on T1 which is not significantly changed from prior, osteoporosis   -pain control: dilaudid 1mg q4hrs PRN  -lidocaine patch  -soft tissue techniques to neck  -PT/OT DDx: migraine, strain/sprain,   CT spine showing spinal stenosis c5-c6 and c6-C7, 3mm anterolisthesis C7 on T1 which is not significantly changed from prior, osteoporosis   -pain control: dilaudid 1mg q4hrs PRN  -soft tissue techniques to neck  -PT/OT

## 2019-06-22 NOTE — H&P ADULT - NSHPPHYSICALEXAM_GEN_ALL_CORE
PHYSICAL EXAM:  Constitutional: Elderly female, slight distress, sitting in chair   HEENT: PERRL, EOMI, sclera non-icteric, neck supple, trachea midline, no masses, MMM  Neck: Diffuse tenderness to palpation in paraspinal area of cervical spine and bilateral trapezius, erythematous, NEGATIVE cervical compression test; pt actively moves neck at 60 degress rotationally, extension to 30 degrees, flexion to 50degrees; passive movement causes 10/10 pain in all motions and planes  Respiratory: CTAB, no rhonchi, no rales, without accessory muscle use and no intercostal retractions  Cardiovascular: RRR, normal S1S2, 1/6 soft systolic murmur heard at apex  Gastrointestinal: soft, NTND, no masses palpable, BS normal  Extremities: Warm, well perfused, pulses equal bilateral upper and lower extremities, no edema, no clubbing  Skin: Normal temperature, warm, dry    Neurologic:   	- Mental Status:  AAOx3; speech is fluent with intact naming, repetition, and comprehension  	- Cranial Nerves II-XII:    	II:  PERRLA; visual fields are full to confrontation   	III, IV, VI:  EOMI, no nystagmus  	V:  facial sensation is intact in the V1-V3 distribution bilaterally.  	VII:  face is symmetric with normal eye closure and smile  	VIII:  hearing is intact to finger rub  	IX, X:  uvula is midline and soft palate rises symmetrically  	XI:  head turning and shoulder shrug are intact bilaterally  	XII:  tongue protrudes in the midline  	- Motor:  strength is 5/5 upper and lower extremity; normal muscle bulk and tone throughout  	- Reflexes:  2+ and symmetric at the biceps, triceps, brachioradialis, knees;  plantar reflexes downgoing bilaterally  	- Sensory:  intact to light touch, pin prick, vibration, and joint-position sense throughout  	- Coordination:  finger-nose-finger and heel-knee-shin intact without dysmetria; rapid alternating hand movements intact, no dysmetria  - Gait:  not assessed

## 2019-06-22 NOTE — H&P ADULT - HISTORY OF PRESENT ILLNESS
67yo F PMHx Anxiety presents with 3 days of neck pain that has not improved. She was referred by Dr. Samano to ED after she was seen on Wednesday 6/19 for a sharp, localized 10/10 neck pain/stiffness without radiation, worsened by movement in which she was given oxycodone and flexeril.  The pain neither resolved or improved. Pt reports pain is getting worse. She reports no fevers, recent trauma, sick contacts, chest pain with radiation to arms, palpitations, SOB, cough, chills, night sweats, sore throat, headaches, phonophobia, weight loss, photophobia, weakness in arms or legs, dysuria, urgency.    ED T101.7* HR 93* 134/84  RR18 96%. Labs: wbc 11, .9. UA: protein 30, +leukocyte esterase bacteria present, negative nitrites. Given 1x 1g ceftriaxone IV, 1g tylenol, 2x morphine 4mg IV, 1x diazepam 5mg. Admitted for meningitis rule out. 67yo F PMHx Anxiety presents with 3 days of neck pain that has not improved. She was referred by Dr. Samano to ED after she was seen on Wednesday 6/19 for a sharp, localized 10/10 neck pain/stiffness without radiation, worsened by movement in which she was given oxycodone and flexeril.  The pain neither resolved or improved. Pt reports pain is getting worse. She reports no fevers, recent trauma, sick contacts, chest pain with radiation to arms, palpitations, SOB, cough, chills, night sweats, sore throat, headaches, phonophobia, weight loss, photophobia, weakness in arms or legs, dysuria, urgency.    ED T101.7* oral  HR 93* 134/84  RR18 96%. Labs: wbc 11, .9. UA: protein 30, +leukocyte esterase bacteria present, negative nitrites. Given 1x 1g ceftriaxone IV, 1g tylenol, 2x morphine 4mg IV, 1x diazepam 5mg. Admitted for meningitis rule out. 67yo F PMHx Anxiety presents with 3 days of neck pain that has not improved. She was referred by Dr. Samano to ED after she was seen on Wednesday 6/19 for a sharp, localized 10/10 neck pain/stiffness without radiation, worsened by movement in which she was given oxycodone and flexeril.  The pain neither resolved or improved. Pt reports pain is getting worse. Last recent travel in May to [place]. She reports no fevers, recent trauma, sick contacts, chest pain with radiation to arms, palpitations, SOB, cough, chills, night sweats, sore throat, headaches, phonophobia, weight loss, photophobia, weakness in arms or legs, dysuria, urgency.    ED T101.7* oral  HR 93* 134/84  RR18 96%. Labs: wbc 11, .9. UA: protein 30, +leukocyte esterase bacteria present, negative nitrites. Given 1x 1g ceftriaxone IV, 1g tylenol, 2x morphine 4mg IV, 1x diazepam 5mg. Admitted for meningitis rule out. 69yo F PMHx Anxiety presents with 3 days of neck pain that has not improved. She was referred by Dr. Samano to ED after she was seen on Wednesday 6/19 for a sharp, localized 10/10 neck pain/stiffness without radiation, worsened by movement in which she was given oxycodone and flexeril.  The pain neither resolved or improved. Pt reports pain is getting worse. Last recent travel in May to [place]. She reports no fevers, recent trauma, sick contacts, chest pain with radiation to arms, palpitations, SOB, cough, chills, night sweats, sore throat, headaches, phonophobia, weight loss, photophobia, weakness in arms or legs, dysuria, urgency.    ED T101.7* oral  HR 93* 134/84  RR18 96%. Labs: wbc 11, .9. UA: protein 30, +leukocyte esterase bacteria present, negative nitrites. Given 1x 1g ceftriaxone IV, 1g tylenol, 2x morphine 4mg IV, 1x diazepam 5mg. Admitted for neck pain

## 2019-06-22 NOTE — H&P ADULT - PROBLEM SELECTOR PLAN 7
1) PCP Contacted on Admission: (Y/N) --> Name & Phone #: Dr. Samano  2) Date of Contact with PCP: 6/22  3) PCP Contacted at Discharge: (Y/N)  4) Summary of Handoff Given to PCP:   5) Post-Discharge Appointment Date and Location: Fluids: None  Electrolytes: replete as necessary, K>4, Mg>2   Nutrition: TLC DASH  Bowel Regimen: none  DVT ppx: lovenox  Code: Full  Disposition: [facility]

## 2019-06-22 NOTE — H&P ADULT - NSHPSOCIALHISTORY_GEN_ALL_CORE
Previous smoker 15years (quit in 1980s), bottle of beer nightly, sexually active with  with no protection

## 2019-06-22 NOTE — ED PROVIDER NOTE - OBJECTIVE STATEMENT
68 year old female with past medical history of restless leg syndrome presents to ED with concern for neck pain x 3 days.  Patient states pain has gotten progressively worse since onset.  She was evaluated by her PCP, Dr. Lesvia Samano, diagnosed with likely "pinched nerve" as per patient, and prescribed muscle relaxant and percocet.  Patient does not feel much relief from these medications, prompting her ED visit today.  She denies fever, though is noted to be febrile in triage on arrival to ED.  She denies chills, chest pain, shortness of breath, abdominal pain, nausea, emesis, changes to bowel movements, known trauma/injury to neck, rashes, sick contacts, cough/URI symptoms or any additional acute complaints or concerns at this time.

## 2019-06-22 NOTE — H&P ADULT - PROBLEM SELECTOR PLAN 5
Takes home simvastatin 20mg. Therapeutic interchange with atorvastatin -pain control PRN  -see above plan -pain control PRN  -ativan 0.5mg IV PRN  -see above plan

## 2019-06-22 NOTE — ED ADULT TRIAGE NOTE - BP NONINVASIVE SYSTOLIC (MM HG)
134 Principal Discharge DX:	Bradycardia  Secondary Diagnosis:	Hypokalemia  Secondary Diagnosis:	ANGELINA (acute kidney injury)  Secondary Diagnosis:	Closed head injury  Secondary Diagnosis:	Altered mental status  Secondary Diagnosis:	Elevated troponin

## 2019-06-22 NOTE — ED ADULT NURSE NOTE - OBJECTIVE STATEMENT
pt to ER w/ report of neck pain since Wed.  Pt reports taking muscle relaxing medication and percocet at home w/o relief.  Pt noted to be febrile in triage w/ oral temp 101.7.  temp remeasured at 100.1.  Pt denies cp/sob/f/c/n/v.  IV access established, labs drawn and sent. Will continue to monitor.

## 2019-06-22 NOTE — ED PROVIDER NOTE - CLINICAL SUMMARY MEDICAL DECISION MAKING FREE TEXT BOX
Patient in ED w concern for neck pain that began several days ago and seems to be getting worse.  Patient followed closely by Dr. Samano for these symptoms and decided to come to ED today when despite taking the medications Dr. Samano had prescribed her several days ago at symptom onset, she was not feeling relief.  Patient noted to be febrile in triage.  Given IVF, tylenol and labs sent.  CT imaging of cervical spine also ordered and results reviewed.  Dr. Samano in ED to evaluate and discuss lumbar puncture as patient hesitant and there is concern for patient's ability to cooperate for safe procedure completion.  Dr. Samano requesting admission to his service and to hold off on spinal tap at this time.  He is requesting 1 dose of rocephin in ED.  Rocephin is ordered and patient noting significant improvement in symptoms with valium and morphine.  Will plan for admission at this time.  Patient and  at bedside aware of plan and in agreement.  As per Dr. Samano, he will consult with neurology team on admission.

## 2019-06-23 DIAGNOSIS — K21.9 GASTRO-ESOPHAGEAL REFLUX DISEASE WITHOUT ESOPHAGITIS: ICD-10-CM

## 2019-06-23 LAB
ALBUMIN SERPL ELPH-MCNC: 3.9 G/DL — SIGNIFICANT CHANGE UP (ref 3.3–5)
ALP SERPL-CCNC: 55 U/L — SIGNIFICANT CHANGE UP (ref 40–120)
ALT FLD-CCNC: 25 U/L — SIGNIFICANT CHANGE UP (ref 10–45)
ANION GAP SERPL CALC-SCNC: 9 MMOL/L — SIGNIFICANT CHANGE UP (ref 5–17)
AST SERPL-CCNC: 27 U/L — SIGNIFICANT CHANGE UP (ref 10–40)
BILIRUB SERPL-MCNC: 0.5 MG/DL — SIGNIFICANT CHANGE UP (ref 0.2–1.2)
BUN SERPL-MCNC: 9 MG/DL — SIGNIFICANT CHANGE UP (ref 7–23)
CALCIUM SERPL-MCNC: 9.2 MG/DL — SIGNIFICANT CHANGE UP (ref 8.4–10.5)
CHLORIDE SERPL-SCNC: 105 MMOL/L — SIGNIFICANT CHANGE UP (ref 96–108)
CO2 SERPL-SCNC: 27 MMOL/L — SIGNIFICANT CHANGE UP (ref 22–31)
CREAT SERPL-MCNC: 0.69 MG/DL — SIGNIFICANT CHANGE UP (ref 0.5–1.3)
CULTURE RESULTS: NO GROWTH — SIGNIFICANT CHANGE UP
FOLATE SERPL-MCNC: >20 NG/ML — SIGNIFICANT CHANGE UP
GLUCOSE SERPL-MCNC: 113 MG/DL — HIGH (ref 70–99)
HBA1C BLD-MCNC: 5.1 % — SIGNIFICANT CHANGE UP (ref 4–5.6)
HCV AB S/CO SERPL IA: 0.1 S/CO — SIGNIFICANT CHANGE UP
HCV AB SERPL-IMP: SIGNIFICANT CHANGE UP
MAGNESIUM SERPL-MCNC: 1.9 MG/DL — SIGNIFICANT CHANGE UP (ref 1.6–2.6)
POTASSIUM SERPL-MCNC: 3.9 MMOL/L — SIGNIFICANT CHANGE UP (ref 3.5–5.3)
POTASSIUM SERPL-SCNC: 3.9 MMOL/L — SIGNIFICANT CHANGE UP (ref 3.5–5.3)
PROCALCITONIN SERPL-MCNC: 0.09 NG/ML — SIGNIFICANT CHANGE UP (ref 0.02–0.1)
PROT SERPL-MCNC: 7.1 G/DL — SIGNIFICANT CHANGE UP (ref 6–8.3)
SODIUM SERPL-SCNC: 141 MMOL/L — SIGNIFICANT CHANGE UP (ref 135–145)
SPECIMEN SOURCE: SIGNIFICANT CHANGE UP
TSH SERPL-MCNC: 3.14 UIU/ML — SIGNIFICANT CHANGE UP (ref 0.35–4.94)
VIT B12 SERPL-MCNC: 1174 PG/ML — SIGNIFICANT CHANGE UP (ref 232–1245)

## 2019-06-23 PROCEDURE — 99232 SBSQ HOSP IP/OBS MODERATE 35: CPT | Mod: GC

## 2019-06-23 PROCEDURE — 72141 MRI NECK SPINE W/O DYE: CPT | Mod: 26

## 2019-06-23 RX ORDER — KETOROLAC TROMETHAMINE 30 MG/ML
30 SYRINGE (ML) INJECTION ONCE
Refills: 0 | Status: DISCONTINUED | OUTPATIENT
Start: 2019-06-23 | End: 2019-06-23

## 2019-06-23 RX ORDER — GABAPENTIN 400 MG/1
400 CAPSULE ORAL AT BEDTIME
Refills: 0 | Status: DISCONTINUED | OUTPATIENT
Start: 2019-06-23 | End: 2019-06-24

## 2019-06-23 RX ORDER — POTASSIUM CHLORIDE 20 MEQ
20 PACKET (EA) ORAL ONCE
Refills: 0 | Status: COMPLETED | OUTPATIENT
Start: 2019-06-23 | End: 2019-06-23

## 2019-06-23 RX ORDER — MAGNESIUM SULFATE 500 MG/ML
1 VIAL (ML) INJECTION ONCE
Refills: 0 | Status: COMPLETED | OUTPATIENT
Start: 2019-06-23 | End: 2019-06-23

## 2019-06-23 RX ADMIN — HYDROMORPHONE HYDROCHLORIDE 1 MILLIGRAM(S): 2 INJECTION INTRAMUSCULAR; INTRAVENOUS; SUBCUTANEOUS at 05:35

## 2019-06-23 RX ADMIN — HYDROMORPHONE HYDROCHLORIDE 1 MILLIGRAM(S): 2 INJECTION INTRAMUSCULAR; INTRAVENOUS; SUBCUTANEOUS at 10:01

## 2019-06-23 RX ADMIN — HYDROMORPHONE HYDROCHLORIDE 1 MILLIGRAM(S): 2 INJECTION INTRAMUSCULAR; INTRAVENOUS; SUBCUTANEOUS at 05:17

## 2019-06-23 RX ADMIN — ENOXAPARIN SODIUM 40 MILLIGRAM(S): 100 INJECTION SUBCUTANEOUS at 19:11

## 2019-06-23 RX ADMIN — Medication 100 GRAM(S): at 09:42

## 2019-06-23 RX ADMIN — Medication 20 MILLIEQUIVALENT(S): at 09:42

## 2019-06-23 RX ADMIN — HYDROMORPHONE HYDROCHLORIDE 1 MILLIGRAM(S): 2 INJECTION INTRAMUSCULAR; INTRAVENOUS; SUBCUTANEOUS at 23:00

## 2019-06-23 RX ADMIN — SIMVASTATIN 20 MILLIGRAM(S): 20 TABLET, FILM COATED ORAL at 21:54

## 2019-06-23 RX ADMIN — Medication 40 MILLIGRAM(S): at 19:11

## 2019-06-23 RX ADMIN — HYDROMORPHONE HYDROCHLORIDE 1 MILLIGRAM(S): 2 INJECTION INTRAMUSCULAR; INTRAVENOUS; SUBCUTANEOUS at 22:25

## 2019-06-23 RX ADMIN — HYDROMORPHONE HYDROCHLORIDE 1 MILLIGRAM(S): 2 INJECTION INTRAMUSCULAR; INTRAVENOUS; SUBCUTANEOUS at 10:30

## 2019-06-23 RX ADMIN — Medication 30 MILLIGRAM(S): at 15:26

## 2019-06-23 RX ADMIN — GABAPENTIN 400 MILLIGRAM(S): 400 CAPSULE ORAL at 21:54

## 2019-06-23 RX ADMIN — Medication 30 MILLIGRAM(S): at 15:49

## 2019-06-23 NOTE — OCCUPATIONAL THERAPY INITIAL EVALUATION ADULT - GENERAL OBSERVATIONS, REHAB EVAL
Right hand dominant. Chart reviewed, patient cleared for OT eval by RADHA Lazo. Received semi-supine, tearful, +heplock.

## 2019-06-23 NOTE — PHYSICAL THERAPY INITIAL EVALUATION ADULT - ADDITIONAL COMMENTS
Pt. reports living in an elevator building with two steps to enter. Pt. started using a cane since onset of symptoms.

## 2019-06-23 NOTE — PHYSICAL THERAPY INITIAL EVALUATION ADULT - PREDICTED DURATION OF THERAPY (DAYS/WKS), PT EVAL
Pt. would benefit from further PT follow up to improve cervical ROM, posture, gait, transfers, balance.

## 2019-06-23 NOTE — PHYSICAL THERAPY INITIAL EVALUATION ADULT - RANGE OF MOTION EXAMINATION, REHAB EVAL
no ROM deficits were identified/except limited ROM of c-spine in all directions ue to pain and protective guarding.

## 2019-06-23 NOTE — PHYSICAL THERAPY INITIAL EVALUATION ADULT - PERTINENT HX OF CURRENT PROBLEM, REHAB EVAL
Pt. is a 68 y.o. female admitted with three days of 10/10 neck pain/ stiffness. Pt. was found to have C5-7 spinal canal stenosis.

## 2019-06-23 NOTE — OCCUPATIONAL THERAPY INITIAL EVALUATION ADULT - RANGE OF MOTION EXAMINATION, UPPER EXTREMITY
bilateral UE Active ROM was WFL  (within functional limits)/+pain with bilateral shoulder flexion/internal rotation, +surgical incision right palm 2/2 recent tendon release of digit 3 trigger finger (last Thursday)

## 2019-06-23 NOTE — OCCUPATIONAL THERAPY INITIAL EVALUATION ADULT - PERTINENT HX OF CURRENT PROBLEM, REHAB EVAL
MRI 6/23/19 shows advanced disc degeneration C3-7 with predominantly sclerotic reaction of bone and mild edema signal.

## 2019-06-23 NOTE — OCCUPATIONAL THERAPY INITIAL EVALUATION ADULT - MANUAL MUSCLE TESTING RESULTS, REHAB EVAL
Bilateral shoulder flexion 3/5 (resistance deferred 2/2 pain), bilateral elbow flexion/extension, wrist extension,  no strength deficits noted

## 2019-06-23 NOTE — OCCUPATIONAL THERAPY INITIAL EVALUATION ADULT - MD ORDER
Per chart, 69yo F PMHx Anxiety presents with 3 days of neck pain that has not improved. She was referred by Dr. Samano to ED after she was seen on Wednesday 6/19 for a sharp, localized 10/10 neck pain/stiffness without radiation, worsened by movement in which she was given oxycodone and flexeril.  The pain neither resolved or improved. Pt reports pain is getting worse. Last recent travel in May to [place].

## 2019-06-23 NOTE — OCCUPATIONAL THERAPY INITIAL EVALUATION ADULT - ADDITIONAL COMMENTS
Per patient she was independent with ADL and ambulation PTA, has been using SC from prior knee surgery since onset of neck pain. Reports  will not be home during the day to assist, but friends may be able to stop by as needed.

## 2019-06-24 ENCOUNTER — TRANSCRIPTION ENCOUNTER (OUTPATIENT)
Age: 69
End: 2019-06-24

## 2019-06-24 VITALS
RESPIRATION RATE: 17 BRPM | SYSTOLIC BLOOD PRESSURE: 126 MMHG | WEIGHT: 124.12 LBS | HEIGHT: 63 IN | OXYGEN SATURATION: 98 % | TEMPERATURE: 99 F | HEART RATE: 60 BPM | DIASTOLIC BLOOD PRESSURE: 80 MMHG

## 2019-06-24 LAB
ANION GAP SERPL CALC-SCNC: 12 MMOL/L — SIGNIFICANT CHANGE UP (ref 5–17)
APTT BLD: 29.4 SEC — SIGNIFICANT CHANGE UP (ref 27.5–36.3)
BUN SERPL-MCNC: 12 MG/DL — SIGNIFICANT CHANGE UP (ref 7–23)
CALCIUM SERPL-MCNC: 9.5 MG/DL — SIGNIFICANT CHANGE UP (ref 8.4–10.5)
CHLORIDE SERPL-SCNC: 104 MMOL/L — SIGNIFICANT CHANGE UP (ref 96–108)
CO2 SERPL-SCNC: 27 MMOL/L — SIGNIFICANT CHANGE UP (ref 22–31)
CREAT SERPL-MCNC: 0.68 MG/DL — SIGNIFICANT CHANGE UP (ref 0.5–1.3)
GLUCOSE BLDC GLUCOMTR-MCNC: 119 MG/DL — HIGH (ref 70–99)
GLUCOSE SERPL-MCNC: 94 MG/DL — SIGNIFICANT CHANGE UP (ref 70–99)
HCT VFR BLD CALC: 30.8 % — LOW (ref 34.5–45)
HGB BLD-MCNC: 10.3 G/DL — LOW (ref 11.5–15.5)
INR BLD: 1.13 — SIGNIFICANT CHANGE UP (ref 0.88–1.16)
MAGNESIUM SERPL-MCNC: 1.9 MG/DL — SIGNIFICANT CHANGE UP (ref 1.6–2.6)
MCHC RBC-ENTMCNC: 33.4 GM/DL — SIGNIFICANT CHANGE UP (ref 32–36)
MCHC RBC-ENTMCNC: 33.4 PG — SIGNIFICANT CHANGE UP (ref 27–34)
MCV RBC AUTO: 100 FL — SIGNIFICANT CHANGE UP (ref 80–100)
NRBC # BLD: 0 /100 WBCS — SIGNIFICANT CHANGE UP (ref 0–0)
PLATELET # BLD AUTO: 202 K/UL — SIGNIFICANT CHANGE UP (ref 150–400)
POTASSIUM SERPL-MCNC: 4.3 MMOL/L — SIGNIFICANT CHANGE UP (ref 3.5–5.3)
POTASSIUM SERPL-SCNC: 4.3 MMOL/L — SIGNIFICANT CHANGE UP (ref 3.5–5.3)
PROTHROM AB SERPL-ACNC: 12.8 SEC — SIGNIFICANT CHANGE UP (ref 10–12.9)
RBC # BLD: 3.08 M/UL — LOW (ref 3.8–5.2)
RBC # FLD: 12.8 % — SIGNIFICANT CHANGE UP (ref 10.3–14.5)
SODIUM SERPL-SCNC: 143 MMOL/L — SIGNIFICANT CHANGE UP (ref 135–145)
WBC # BLD: 5.83 K/UL — SIGNIFICANT CHANGE UP (ref 3.8–10.5)
WBC # FLD AUTO: 5.83 K/UL — SIGNIFICANT CHANGE UP (ref 3.8–10.5)

## 2019-06-24 PROCEDURE — 96374 THER/PROPH/DIAG INJ IV PUSH: CPT

## 2019-06-24 PROCEDURE — 87798 DETECT AGENT NOS DNA AMP: CPT

## 2019-06-24 PROCEDURE — 83605 ASSAY OF LACTIC ACID: CPT

## 2019-06-24 PROCEDURE — 71045 X-RAY EXAM CHEST 1 VIEW: CPT

## 2019-06-24 PROCEDURE — 99232 SBSQ HOSP IP/OBS MODERATE 35: CPT | Mod: GC

## 2019-06-24 PROCEDURE — 82746 ASSAY OF FOLIC ACID SERUM: CPT

## 2019-06-24 PROCEDURE — 82803 BLOOD GASES ANY COMBINATION: CPT

## 2019-06-24 PROCEDURE — 85027 COMPLETE CBC AUTOMATED: CPT

## 2019-06-24 PROCEDURE — 72141 MRI NECK SPINE W/O DYE: CPT

## 2019-06-24 PROCEDURE — 72125 CT NECK SPINE W/O DYE: CPT

## 2019-06-24 PROCEDURE — 82330 ASSAY OF CALCIUM: CPT

## 2019-06-24 PROCEDURE — 86803 HEPATITIS C AB TEST: CPT

## 2019-06-24 PROCEDURE — 87581 M.PNEUMON DNA AMP PROBE: CPT

## 2019-06-24 PROCEDURE — 80053 COMPREHEN METABOLIC PANEL: CPT

## 2019-06-24 PROCEDURE — 82962 GLUCOSE BLOOD TEST: CPT

## 2019-06-24 PROCEDURE — 97161 PT EVAL LOW COMPLEX 20 MIN: CPT

## 2019-06-24 PROCEDURE — 87633 RESP VIRUS 12-25 TARGETS: CPT

## 2019-06-24 PROCEDURE — 87086 URINE CULTURE/COLONY COUNT: CPT

## 2019-06-24 PROCEDURE — 87486 CHLMYD PNEUM DNA AMP PROBE: CPT

## 2019-06-24 PROCEDURE — 84295 ASSAY OF SERUM SODIUM: CPT

## 2019-06-24 PROCEDURE — 99285 EMERGENCY DEPT VISIT HI MDM: CPT | Mod: 25

## 2019-06-24 PROCEDURE — 87040 BLOOD CULTURE FOR BACTERIA: CPT

## 2019-06-24 PROCEDURE — 96375 TX/PRO/DX INJ NEW DRUG ADDON: CPT

## 2019-06-24 PROCEDURE — 80307 DRUG TEST PRSMV CHEM ANLYZR: CPT

## 2019-06-24 PROCEDURE — 84145 PROCALCITONIN (PCT): CPT

## 2019-06-24 PROCEDURE — 83735 ASSAY OF MAGNESIUM: CPT

## 2019-06-24 PROCEDURE — 84443 ASSAY THYROID STIM HORMONE: CPT

## 2019-06-24 PROCEDURE — 85610 PROTHROMBIN TIME: CPT

## 2019-06-24 PROCEDURE — 36415 COLL VENOUS BLD VENIPUNCTURE: CPT

## 2019-06-24 PROCEDURE — 80048 BASIC METABOLIC PNL TOTAL CA: CPT

## 2019-06-24 PROCEDURE — 85730 THROMBOPLASTIN TIME PARTIAL: CPT

## 2019-06-24 PROCEDURE — 82607 VITAMIN B-12: CPT

## 2019-06-24 PROCEDURE — 84132 ASSAY OF SERUM POTASSIUM: CPT

## 2019-06-24 PROCEDURE — 81001 URINALYSIS AUTO W/SCOPE: CPT

## 2019-06-24 PROCEDURE — 85025 COMPLETE CBC W/AUTO DIFF WBC: CPT

## 2019-06-24 PROCEDURE — 83036 HEMOGLOBIN GLYCOSYLATED A1C: CPT

## 2019-06-24 RX ORDER — KETOROLAC TROMETHAMINE 30 MG/ML
30 SYRINGE (ML) INJECTION ONCE
Refills: 0 | Status: DISCONTINUED | OUTPATIENT
Start: 2019-06-24 | End: 2019-06-24

## 2019-06-24 RX ORDER — GABAPENTIN 400 MG/1
0 CAPSULE ORAL
Qty: 0 | Refills: 0 | DISCHARGE

## 2019-06-24 RX ADMIN — Medication 30 MILLIGRAM(S): at 12:40

## 2019-06-24 RX ADMIN — Medication 30 MILLIGRAM(S): at 13:53

## 2019-06-24 RX ADMIN — HYDROMORPHONE HYDROCHLORIDE 1 MILLIGRAM(S): 2 INJECTION INTRAMUSCULAR; INTRAVENOUS; SUBCUTANEOUS at 06:09

## 2019-06-24 RX ADMIN — HYDROMORPHONE HYDROCHLORIDE 1 MILLIGRAM(S): 2 INJECTION INTRAMUSCULAR; INTRAVENOUS; SUBCUTANEOUS at 07:00

## 2019-06-24 NOTE — PROGRESS NOTE ADULT - SUBJECTIVE AND OBJECTIVE BOX
INTERVAL HPI/OVERNIGHT EVENTS:  Looks better today; less pain;Okay to discharge with physical therapy as outpatient;       MEDICATIONS  (STANDING):  enoxaparin Injectable 40 milliGRAM(s) SubCutaneous every 24 hours  gabapentin 400 milliGRAM(s) Oral at bedtime  ketorolac   Injectable 30 milliGRAM(s) IV Push once  PARoxetine 40 milliGRAM(s) Oral daily  simvastatin 20 milliGRAM(s) Oral at bedtime    MEDICATIONS  (PRN):  HYDROmorphone  Injectable 1 milliGRAM(s) IV Push every 4 hours PRN Moderate Pain (4 - 6)  LORazepam   Injectable 0.5 milliGRAM(s) IV Push every 8 hours PRN Anxiety      Allergies    penicillin (Urticaria)    Intolerances        Vital Signs Last 24 Hrs  T(C): 37 (2019 08:41), Max: 37 (2019 08:41)  T(F): 98.6 (2019 08:41), Max: 98.6 (2019 08:41)  HR: 60 (2019 08:41) (60 - 74)  BP: 126/80 (2019 08:41) (123/80 - 159/108)  BP(mean): --  RR: 17 (2019 08:41) (17 - 18)  SpO2: 98% (2019 08:41) (94% - 98%)          Constitutional:  Awake and alert    Eyes: ZAY    ENMT: Negative    Neck: Supple  less pain    Back:  no tenderness     Respiratory:      Cardiovascular: S1 S2    Gastrointestinal:  soft    Genitourinary:    Extremities:  no edema    Vascular:    Neurological:  intact    Skin:    Lymph Nodes:             @ 07:01  -   @ 07:00  --------------------------------------------------------  IN: 0 mL / OUT: 400 mL / NET: -400 mL      LABS:                        10.3   5.83  )-----------( 202      ( 2019 08:17 )             30.8         143  |  104  |  12  ----------------------------<  94  4.3   |  27  |  0.68    Ca    9.5      2019 08:17  Mg     1.9     06-24    TPro  7.1  /  Alb  3.9  /  TBili  0.5  /  DBili  x   /  AST  27  /  ALT  25  /  AlkPhos  55  06-    PT/INR - ( 2019 08:17 )   PT: 12.8 sec;   INR: 1.13          PTT - ( 2019 08:17 )  PTT:29.4 sec  Urinalysis Basic - ( 2019 14:50 )    Color: Yellow / Appearance: Clear / S.015 / pH: x  Gluc: x / Ketone: NEGATIVE  / Bili: Negative / Urobili: 0.2 E.U./dL   Blood: x / Protein: 30 mg/dL / Nitrite: NEGATIVE   Leuk Esterase: Trace / RBC: 5-10 /HPF / WBC > 10 /HPF   Sq Epi: x / Non Sq Epi: Moderate /HPF / Bacteria: Present /HPF        RADIOLOGY & ADDITIONAL TESTS:
INTERVAL HPI/OVERNIGHT EVENTS:  Continues to have severe neck pain with spasm; CT noted; Not much help with narcotics;        MEDICATIONS  (STANDING):  enoxaparin Injectable 40 milliGRAM(s) SubCutaneous every 24 hours  simvastatin 20 milliGRAM(s) Oral at bedtime    MEDICATIONS  (PRN):  HYDROmorphone  Injectable 1 milliGRAM(s) IV Push every 4 hours PRN Moderate Pain (4 - 6)  LORazepam   Injectable 0.5 milliGRAM(s) IV Push every 8 hours PRN Anxiety      Allergies    penicillin (Urticaria)    Intolerances        Vital Signs Last 24 Hrs  T(C): 36.7 (2019 09:07), Max: 38.7 (2019 12:11)  T(F): 98 (2019 09:07), Max: 101.7 (2019 12:11)  HR: 66 (2019 09:07) (66 - 98)  BP: 108/64 (2019 09:07) (101/66 - 146/89)  BP(mean): --  RR: 18 (2019 09:07) (18 - 18)  SpO2: 97% (2019 09:07) (93% - 97%)          Constitutional:  Awake with pain    Eyes: ZAY    ENMT: Negative    Neck: Supple  pain with movement of neck in any direction    Back:  no tenderness     Respiratory:  clear    Cardiovascular: S1 S2    Gastrointestinal: soft    Genitourinary:    Extremities:  no edema      Vascular:    Neurological:  No focal signs    Skin:    Lymph Nodes:            LABS:                        11.3   11.01 )-----------( 199      ( 2019 13:35 )             33.6         141  |  105  |  9   ----------------------------<  113<H>  3.9   |  27  |  0.69    Ca    9.2      2019 07:19  Mg     1.9         TPro  7.1  /  Alb  3.9  /  TBili  0.5  /  DBili  x   /  AST  27  /  ALT  25  /  AlkPhos  55        Urinalysis Basic - ( 2019 14:50 )    Color: Yellow / Appearance: Clear / S.015 / pH: x  Gluc: x / Ketone: NEGATIVE  / Bili: Negative / Urobili: 0.2 E.U./dL   Blood: x / Protein: 30 mg/dL / Nitrite: NEGATIVE   Leuk Esterase: Trace / RBC: 5-10 /HPF / WBC > 10 /HPF   Sq Epi: x / Non Sq Epi: Moderate /HPF / Bacteria: Present /HPF        RADIOLOGY & ADDITIONAL TESTS:

## 2019-06-24 NOTE — DISCHARGE NOTE PROVIDER - CARE PROVIDERS DIRECT ADDRESSES
,DirectAddress_Unknown,DirectAddress_Unknown,[email or web address]

## 2019-06-24 NOTE — DISCHARGE NOTE PROVIDER - PROVIDER TOKENS
PROVIDER:[TOKEN:[[id number]]],PROVIDER:[TOKEN:[[id number]]],PROVIDER:[TOKEN:[[id number]]]

## 2019-06-24 NOTE — DISCHARGE NOTE PROVIDER - NSDCCPCAREPLAN_GEN_ALL_CORE_FT
PRINCIPAL DISCHARGE DIAGNOSIS  Diagnosis: Neck pain  Assessment and Plan of Treatment: You had a CT cervical and MR cervical spine. You were found to have have spinal stenosis at cervical levels: C3-4, C4-5, C6-7. Follow up with Dr. Samano on outpatient. Additionally you should make an appointment with physiatry with Dr. Neal and orthopedic surgeon Dr. Peacock. Please continue felxeril and oxycodone for pain management as prescribed by Dr. Samano.

## 2019-06-24 NOTE — DISCHARGE NOTE PROVIDER - HOSPITAL COURSE
69yo F PMHx Anxiety presents with 3 days of neck pain/stiffness which did not improve with muscle relaxant and pain regimen, admitted for further workup of neck pain. Follows with Dr. Samano as outpatient. Pt pain managed by toradol IV and dialudid 1mg IV. CT Cspine wo contrast showed moderate-severe spinal stenosis discogenic degenerative changes of the cervical spine including spinal canal stenosis at C5-C6 and C6-C7 and 3 mm anterolisthesis of C7 on T1 which is not significantly changed from prior. Underwent MR no contrast C-spine which revealed Disc disease and ligamentum flavum thickening results in moderate-severe  spinal stenosis C3-4, C4-5, C5-6 and C6-7. Patient will be given script for outpatient physical therapy and occupational therapy. Will follow up with orthopedics, physiatry and Dr. Samano on discharge.

## 2019-06-24 NOTE — CONSULT NOTE ADULT - SUBJECTIVE AND OBJECTIVE BOX
Patient is a 68y old  Female who presents with a chief complaint of Neck pain (2019 19:36)       HPI:  69yo F PMHx Anxiety presents with 3 days of neck pain that has not improved. She was referred by Dr. Samano to ED after she was seen on  for a sharp, localized 10/10 neck pain/stiffness without radiation, worsened by movement in which she was given oxycodone and flexeril.  The pain neither resolved or improved. Pt reports pain is getting worse. Last recent travel in May to [place]. She reports no fevers, recent trauma, sick contacts, chest pain with radiation to arms, palpitations, SOB, cough, chills, night sweats, sore throat, headaches, phonophobia, weight loss, photophobia, weakness in arms or legs, dysuria, urgency.    ED T101.7* oral  HR 93* 134/84  RR18 96%. Labs: wbc 11, .9. UA: protein 30, +leukocyte esterase bacteria present, negative nitrites. Given 1x 1g ceftriaxone IV, 1g tylenol, 2x morphine 4mg IV, 1x diazepam 5mg. Admitted for neck pain (2019 17:18)      PAST MEDICAL & SURGICAL HISTORY:  Anxiety  GERD (gastroesophageal reflux disease)  S/P lumbar laminectomy:     History of total left knee replacement (TKR):       MEDICATIONS  (STANDING):  enoxaparin Injectable 40 milliGRAM(s) SubCutaneous every 24 hours  gabapentin 400 milliGRAM(s) Oral at bedtime  PARoxetine 40 milliGRAM(s) Oral daily  simvastatin 20 milliGRAM(s) Oral at bedtime    MEDICATIONS  (PRN):  HYDROmorphone  Injectable 1 milliGRAM(s) IV Push every 4 hours PRN Moderate Pain (4 - 6)  LORazepam   Injectable 0.5 milliGRAM(s) IV Push every 8 hours PRN Anxiety      Social History: lives with her  in an elevator accessible apartment building, works part time in an office, 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    Functional Level Prior to Admission: ADL independent, walks with a cane    FAMILY HISTORY:      CBC Full  -  ( 2019 13:35 )  WBC Count : 11.01 K/uL  RBC Count : 3.33 M/uL  Hemoglobin : 11.3 g/dL  Hematocrit : 33.6 %  Platelet Count - Automated : 199 K/uL  Mean Cell Volume : 100.9 fl  Mean Cell Hemoglobin : 33.9 pg  Mean Cell Hemoglobin Concentration : 33.6 gm/dL  Auto Neutrophil # : 7.66 K/uL  Auto Lymphocyte # : 1.98 K/uL  Auto Monocyte # : 1.25 K/uL  Auto Eosinophil # : 0.05 K/uL  Auto Basophil # : 0.04 K/uL  Auto Neutrophil % : 69.4 %  Auto Lymphocyte % : 18.0 %  Auto Monocyte % : 11.4 %  Auto Eosinophil % : 0.5 %  Auto Basophil % : 0.4 %          141  |  105  |  9   ----------------------------<  113<H>  3.9   |  27  |  0.69    Ca    9.2      2019 07:19  Mg     1.9         TPro  7.1  /  Alb  3.9  /  TBili  0.5  /  DBili  x   /  AST  27  /  ALT  25  /  AlkPhos  55        Urinalysis Basic - ( 2019 14:50 )    Color: Yellow / Appearance: Clear / S.015 / pH: x  Gluc: x / Ketone: NEGATIVE  / Bili: Negative / Urobili: 0.2 E.U./dL   Blood: x / Protein: 30 mg/dL / Nitrite: NEGATIVE   Leuk Esterase: Trace / RBC: 5-10 /HPF / WBC > 10 /HPF   Sq Epi: x / Non Sq Epi: Moderate /HPF / Bacteria: Present /HPF          Radiology:    < from: CT Cervical Spine No Cont (19 @ 14:58) >  EXAM:  CT CERVICAL SPINE                          PROCEDURE DATE:  2019          INTERPRETATION:  PROCEDURE: CT Cervical spine without contrast    INDICATION: Neck pain    TECHNIQUE: Multiple axial sections were obtained from the mid orbits to   the sternoclavicular joint. Sagittal and coronal reformats were obtained   from the axial data set. The images were reviewed in soft tissue and bone   windows.    COMPARISON: CT neck soft tissues 2015    FINDINGS: There is loss of the normal cervical lordosis which may be   secondary to positioning.There is no facet subluxation. There is a 3 mm   anterolisthesis of C7 on T1 which is not significantly changed from   prior. There is minimal levoscoliosis.     The osseous structures are intact without fracture. Vertebral body   heights are preserved however there is significant vertebral body   sclerosis and marginal osteophytosis of C3-T1.     There is multilevel intervertebral disc space loss from C3 to T1 with   near complete fusion ofC6-C7.    The prevertebral soft tissues are within normal limits.     At the C2/3 level, there is no disc herniation. There is no central   spinal canal stenosis or neural foramen narrowing.    At the C3/4 level, there is a small posterior disc osteophyte complex as   well as bilateral uncovertebral and facet hypertrophy causing mild   bilateral neural foraminal narrowing. There is no spinal canal stenosis.    At the C4/5 level, there is no disc herniation. There is bilateral   uncovertebral and facet hypertrophy causing mild to moderate bilateral   neural foraminal narrowing. There is no spinal canal stenosis.    At the C5/6 level, there is a small posterior disc osteophyte complex as   well as bilateral uncovertebral and facet hypertrophy causing moderate   left and mild right neural foraminal narrowing. There is mild spinal   canal stenosis.    At the C6/7 level, there is bilateral uncovertebral and facet hypertrophy   causing moderate bilateral neural foraminal narrowing. There is mild   spinal canal stenosis.    At the C7/T1 level, there is mild disc uncovering. There is no central   spinal canal stenosis or neural foramen narrowing.      IMPRESSION:   1. There is no acute fracture.    2. Discogenic degenerative changes of the cervical spine as described   above including spinal canal stenosis at C5-C6 and C6-C7. 3 mm   anterolisthesis of C7 on T1 which is not significantly changed from prior.    3. Multilevel osteoarthrosis as described above.             Vital Signs Last 24 Hrs  T(C): 36.8 (2019 06:56), Max: 36.9 (2019 17:46)  T(F): 98.3 (2019 06:56), Max: 98.4 (2019 17:46)  HR: 64 (2019 06:56) (64 - 74)  BP: 123/80 (2019 06:56) (108/64 - 159/108)  BP(mean): --  RR: 18 (2019 06:56) (17 - 18)  SpO2: 94% (2019 06:56) (94% - 98%)    REVIEW OF SYSTEMS:    CONSTITUTIONAL: No fever, weight loss, or fatigue  EYES: No eye pain, visual disturbances, or discharge  ENMT:  No difficulty hearing, tinnitus, vertigo; No sinus or throat pain  NECK: No pain or stiffness  BREASTS: No pain, masses, or nipple discharge  RESPIRATORY: No cough, wheezing, chills or hemoptysis; No shortness of breath  CARDIOVASCULAR: No chest pain, palpitations, dizziness, or leg swelling  GASTROINTESTINAL: No abdominal or epigastric pain. No nausea, vomiting, or hematemesis; No diarrhea or constipation. No melena or hematochezia.  GENITOURINARY: No dysuria, frequency, hematuria, or incontinence  NEUROLOGICAL: No headaches, memory loss, loss of strength, numbness, or tremors  SKIN: No itching, burning, rashes, or lesions   LYMPH NODES: No enlarged glands  ENDOCRINE: No heat or cold intolerance; No hair loss  MUSCULOSKELETAL: localized pain, improved since admission  PSYCHIATRIC: No depression, anxiety, mood swings, or difficulty sleeping  HEME/LYMPH: No easy bruising, or bleeding gums  ALLERGY AND IMMUNOLOGIC: No hives or eczema  VASCULAR: no swelling, erythema      Physical Exam: WDWN 67 yo  woman lying in semi Pérez's position, c/o local neck pain, improved since admission      Neck: dec lateral rotation 0-70 degrees, neg spasms, neg Spurling's maneuver      Neuro Exam:    Motor Exam:    Upper Extremities:     RIght:  5/5   /intrinsics               5/5  wrist flexors/extensors               5/5  biceps/triceps               5/5  deltoid               negative drift    Left :     5/5  /intrinsics               5/5  wrist flexors/extensors               5/5 biceps/triceps               5/5 deltoid               negative drift    Lower Extremities:                 Right:      5/5  DF/PF/ evertors/ invertors                5/5  Quad/ hamstrings                5/5  hip flexors/adductors/abductors                 Left:       5/5  DF/PF/ evertors/ invertors                5/5  Quad/ hamstrings                5/5  hip flexors/adductors/abductors                       Sensory:    intact to LT/PP in all UE/LE dermatomes    DTR:            = biceps/     triceps/     brachioradialis                      = patella/   medial hamstring/ankle                      neg clonus                      neg Babinski                          PM&R Impression:    1) acute non traumatic cervical paraspinal myofascial pain  2) Underlying DDD/ OA/ stenosis  3) no focal weakness        Recommendations:    1) Physical therapy focusing on therapeutic exercises, bed mobility/transfer out of bed evaluation, progressive ambulation with assistive devices prn.    2) Anticipated Disposition Plan/Recs: d/c home, outpatient P.T, f/u as outpatient PRN
Orthopaedic Surgery Consult Note    For Surgeon: Ayush    HPI: 68F hx anxiety admitted for neck pain and stiffness and SIRS workup found to have C3-C7 spinal stenosis with no cord compression. Ortho spine consulted for evaluation. Patient reports neck pain x 5 days. Does not remember an inciting event. No trauma. Denies bilateral upper extremity weakness, numbness or tingling. States that neck pain significantly improved today after taking toradol. Able to flex and extend neck without pain. No focal neurologic deficits.      Allergies    penicillin (Urticaria)    Intolerances      PAST MEDICAL & SURGICAL HISTORY:  Anxiety  GERD (gastroesophageal reflux disease)  S/P lumbar laminectomy: 1991    History of total left knee replacement (TKR): 2015    MEDICATIONS  (STANDING):  enoxaparin Injectable 40 milliGRAM(s) SubCutaneous every 24 hours  gabapentin 400 milliGRAM(s) Oral at bedtime  PARoxetine 40 milliGRAM(s) Oral daily  simvastatin 20 milliGRAM(s) Oral at bedtime    MEDICATIONS  (PRN):  HYDROmorphone  Injectable 1 milliGRAM(s) IV Push every 4 hours PRN Moderate Pain (4 - 6)  LORazepam   Injectable 0.5 milliGRAM(s) IV Push every 8 hours PRN Anxiety      Vital Signs Last 24 Hrs  T(C): 36.9 (23 Jun 2019 21:52), Max: 36.9 (23 Jun 2019 05:01)  T(F): 98.4 (23 Jun 2019 21:52), Max: 98.5 (23 Jun 2019 05:01)  HR: 64 (23 Jun 2019 21:52) (64 - 79)  BP: 142/65 (23 Jun 2019 21:52) (108/64 - 146/89)  BP(mean): --  RR: 17 (23 Jun 2019 17:46) (17 - 18)  SpO2: 98% (23 Jun 2019 21:52) (96% - 98%)    Physical Exam:  Gen: Awake and alert, NAD  Neck - no masses or open wounds, no step offs on C spine  TTP along posterior neck  Sensation intact b/l C5-T1  Motor strength 5/5 b/l upper extremity   2+ radial pulse b/l   fingers warm and well perfused   BCR                         11.3   11.01 )-----------( 199      ( 22 Jun 2019 13:35 )             33.6     06-23    141  |  105  |  9   ----------------------------<  113<H>  3.9   |  27  |  0.69    Ca    9.2      23 Jun 2019 07:19  Mg     1.9     06-23    TPro  7.1  /  Alb  3.9  /  TBili  0.5  /  DBili  x   /  AST  27  /  ALT  25  /  AlkPhos  55  06-23      Imaging:   MRI: FINDINGS:     There is mild prevertebral edema throughout the cervical and upper thoracic   spine.     There is considerable hypointense signal on T1-weighted imaging of the C4,   C5 and C6 vertebral bodies as well as superior C7 and inferior C3 vertebral   bodies corresponding to sclerotic change on CT. Mild concomitant edema   signal is seen on STIR. This is consistent with long-standing degenerative   disc disease. There is no fluid signal within the intervertebral discs.   There is severe disc height loss at these 4 levels. The cervical vertebral   bodies are preserved in height. No focal lytic or blastic process. No marrow   edema signal involves the facets.     Alignment of the cervical spine is unchanged from the preceding CT showing   straightening of lordosis and 4 mm anterolisthesis at C7-T1.     The cervical spinal cord is grossly free of signal abnormality. No   intraspinal fluid collection is identified.     At the individual levels:     C2-3: No spinal canal stenosis or neural foraminal narrowing.     C3-4: There is broad posterior osseous ridging and disc protrusion with mild   ligament above thickening resulting in a moderate narrowing of the central   spinal canal. Uncovertebral spurring results in moderate to severe neural   foraminal narrowing bilaterally. Superimposed left lateral recess disc   extrusion is seen best on sagittal image 6.     C4-5: Bulkier posterior osseous ridging and broad disc protrusion is present   with ligamentum flavum thickening causing severe spinal canal stenosis. AP   diameter of the spinal canal is reduced to 5 mm. Uncovertebral spurring   results in moderate to severe neural foraminal narrowing bilaterally.     C5-6: Posterior osseous ridge and broad protruded disc as well as ligamentum   flavum thickening results in moderate to severe spinal canal stenosis with   AP diameter of the spinal canal reduced to 6 mm. Uncovertebral spurring   results in moderate to severe neural foraminal narrowing bilaterally.     C6-7: Posterior osseous ridging and central disc protrusion orbital ligament   thickening results in moderate to severe spinal canal stenosis.   Uncovertebral spurring results in moderate neural foraminal narrowing, worse   on the left.     C7-T1: There is disc uncovering and ligamentum flavum thickening causing   mild spinal canal stenosis. The neural foramina appear patent.     IMPRESSION:       Advanced disc degeneration C3-7 with predominantly sclerotic reaction of   bone and mild edema signal.     Mild prevertebral edema is nonspecific but likely related to above.   Potentially it may be from tendonitis as well, and may respond to NSAIDs.   There is no fluid signal within the intervertebral discs to suggest early   discitis. No intraspinal fluid collection.     Disc disease and ligamentum flavum thickening results in moderate-severe   spinal stenosis C3-4, C4-5, C5-6 and C6-7. No lissett spinal cord   edema/myelopathic signal. Uncinate spurs at these levels cause advanced   neural foraminal narrowings as well    A/P: 68F with neck pain and stiffness x 5 days and symptoms improving with PO pain meds    - C spine degenerative disc disease and spinal stenosis, no myelopathic symptoms  - No cord compression   - symptoms most likely musculoskeletal in nature  - rec pain control  - No acute surgical intervention  - outpt follow up with Dr. Peacock     -Discussed with Dr. Peacock

## 2019-06-24 NOTE — DISCHARGE NOTE NURSING/CASE MANAGEMENT/SOCIAL WORK - NSDCDPATPORTLINK_GEN_ALL_CORE
You can access the [facility] Patient Portal, offered by [facility], by registering with the following website: [email or web address]

## 2019-06-24 NOTE — DISCHARGE NOTE PROVIDER - NSDCFUADDAPPT_GEN_ALL_CORE_FT
Please make an appointment with Dr. Samano for follow up in a few days.    Please call Dr. Peacock for orthopedics for appointment and follow up.    Please call Dr. Neal for physiatry for appointment and follow up.

## 2019-06-24 NOTE — DISCHARGE NOTE PROVIDER - CARE PROVIDER_API CALL
Brijesh Peacock)  Orthopaedic Surgery  [address] [place] [id number]  Phone: [phone]  Fax: [phone]  Follow Up Time:     Cristhian Neal)  PhysicalRehab Medicine  [address] [address]  Phone: [phone]  Fax: [phone]  Follow Up Time:     Lesvia Samano)  Critical Care Medicine; Internal Medicine  [address], [address]  [place] [address]  Phone: [phone]  Fax: [phone]  Follow Up Time:

## 2019-06-24 NOTE — PROGRESS NOTE ADULT - ATTENDING COMMENTS
Discussed plans at length with housestaff and patient
Okay to discharge; Physical therapy as oupatient

## 2019-06-27 DIAGNOSIS — R65.10 SYSTEMIC INFLAMMATORY RESPONSE SYNDROME (SIRS) OF NON-INFECTIOUS ORIGIN WITHOUT ACUTE ORGAN DYSFUNCTION: ICD-10-CM

## 2019-06-27 DIAGNOSIS — Z96.652 PRESENCE OF LEFT ARTIFICIAL KNEE JOINT: ICD-10-CM

## 2019-06-27 DIAGNOSIS — K21.9 GASTRO-ESOPHAGEAL REFLUX DISEASE WITHOUT ESOPHAGITIS: ICD-10-CM

## 2019-06-27 DIAGNOSIS — M50.320 OTHER CERVICAL DISC DEGENERATION, MID-CERVICAL REGION, UNSPECIFIED LEVEL: ICD-10-CM

## 2019-06-27 DIAGNOSIS — E78.5 HYPERLIPIDEMIA, UNSPECIFIED: ICD-10-CM

## 2019-06-27 DIAGNOSIS — Z88.0 ALLERGY STATUS TO PENICILLIN: ICD-10-CM

## 2019-06-27 DIAGNOSIS — M48.02 SPINAL STENOSIS, CERVICAL REGION: ICD-10-CM

## 2019-06-27 DIAGNOSIS — F41.9 ANXIETY DISORDER, UNSPECIFIED: ICD-10-CM

## 2019-06-27 DIAGNOSIS — M54.2 CERVICALGIA: ICD-10-CM

## 2019-06-27 LAB
CULTURE RESULTS: SIGNIFICANT CHANGE UP
CULTURE RESULTS: SIGNIFICANT CHANGE UP
SPECIMEN SOURCE: SIGNIFICANT CHANGE UP
SPECIMEN SOURCE: SIGNIFICANT CHANGE UP

## 2019-07-02 ENCOUNTER — INBOUND DOCUMENT (OUTPATIENT)
Age: 69
End: 2019-07-02

## 2020-02-21 DIAGNOSIS — Z00.00 ENCOUNTER FOR GENERAL ADULT MEDICAL EXAMINATION W/OUT ABNORMAL FINDINGS: ICD-10-CM

## 2020-02-24 PROBLEM — F41.9 ANXIETY DISORDER, UNSPECIFIED: Chronic | Status: ACTIVE | Noted: 2019-06-22

## 2020-02-24 PROBLEM — K21.9 GASTRO-ESOPHAGEAL REFLUX DISEASE WITHOUT ESOPHAGITIS: Chronic | Status: ACTIVE | Noted: 2019-06-22

## 2020-02-28 ENCOUNTER — LABORATORY RESULT (OUTPATIENT)
Age: 70
End: 2020-02-28

## 2020-02-28 ENCOUNTER — APPOINTMENT (OUTPATIENT)
Dept: INTERNAL MEDICINE | Facility: CLINIC | Age: 70
End: 2020-02-28
Payer: MEDICARE

## 2020-02-28 VITALS
WEIGHT: 135 LBS | HEIGHT: 62 IN | DIASTOLIC BLOOD PRESSURE: 78 MMHG | TEMPERATURE: 98.6 F | OXYGEN SATURATION: 98 % | SYSTOLIC BLOOD PRESSURE: 136 MMHG | HEART RATE: 68 BPM | BODY MASS INDEX: 24.84 KG/M2

## 2020-02-28 DIAGNOSIS — F41.9 ANXIETY DISORDER, UNSPECIFIED: ICD-10-CM

## 2020-02-28 PROCEDURE — 99213 OFFICE O/P EST LOW 20 MIN: CPT | Mod: 25

## 2020-02-28 PROCEDURE — 36415 COLL VENOUS BLD VENIPUNCTURE: CPT

## 2020-02-28 NOTE — HISTORY OF PRESENT ILLNESS
[No Pertinent Cardiac History] : no history of aortic stenosis, atrial fibrillation, coronary artery disease, recent myocardial infarction, or implantable device/pacemaker [No Adverse Anesthesia Reaction] : no adverse anesthesia reaction in self or family member [No Pertinent Pulmonary History] : no history of asthma, COPD, sleep apnea, or smoking [FreeTextEntry1] : Knee arthroscopy [FreeTextEntry2] : March 13 2020 [FreeTextEntry4] : Severe knee pain;  For OR:  [FreeTextEntry3] : Dr. Peacock

## 2020-03-02 LAB
ALBUMIN SERPL ELPH-MCNC: 4.6 G/DL
ALP BLD-CCNC: 56 U/L
ALT SERPL-CCNC: 15 U/L
ANION GAP SERPL CALC-SCNC: 14 MMOL/L
APPEARANCE: CLEAR
APTT BLD: 30.8 SEC
AST SERPL-CCNC: 27 U/L
BASOPHILS # BLD AUTO: 0.05 K/UL
BASOPHILS NFR BLD AUTO: 0.7 %
BILIRUB SERPL-MCNC: 0.4 MG/DL
BILIRUBIN URINE: NEGATIVE
BLOOD URINE: NEGATIVE
BUN SERPL-MCNC: 12 MG/DL
CALCIUM SERPL-MCNC: 9.3 MG/DL
CHLORIDE SERPL-SCNC: 102 MMOL/L
CO2 SERPL-SCNC: 23 MMOL/L
COLOR: NORMAL
CREAT SERPL-MCNC: 0.81 MG/DL
EOSINOPHIL # BLD AUTO: 0.17 K/UL
EOSINOPHIL NFR BLD AUTO: 2.5 %
GLUCOSE QUALITATIVE U: NEGATIVE
GLUCOSE SERPL-MCNC: 76 MG/DL
HCT VFR BLD CALC: 34 %
HGB BLD-MCNC: 11.1 G/DL
IMM GRANULOCYTES NFR BLD AUTO: 0.1 %
INR PPP: 0.98 RATIO
KETONES URINE: NEGATIVE
LEUKOCYTE ESTERASE URINE: ABNORMAL
LYMPHOCYTES # BLD AUTO: 2.24 K/UL
LYMPHOCYTES NFR BLD AUTO: 33.6 %
MAN DIFF?: NORMAL
MCHC RBC-ENTMCNC: 32.6 GM/DL
MCHC RBC-ENTMCNC: 33.4 PG
MCV RBC AUTO: 102.4 FL
MONOCYTES # BLD AUTO: 0.44 K/UL
MONOCYTES NFR BLD AUTO: 6.6 %
NEUTROPHILS # BLD AUTO: 3.76 K/UL
NEUTROPHILS NFR BLD AUTO: 56.5 %
NITRITE URINE: NEGATIVE
PH URINE: 6.5
PLATELET # BLD AUTO: 263 K/UL
POTASSIUM SERPL-SCNC: 4.6 MMOL/L
PROT SERPL-MCNC: 6.7 G/DL
PROTEIN URINE: NEGATIVE
PT BLD: 11.1 SEC
RBC # BLD: 3.32 M/UL
RBC # FLD: 13.2 %
SODIUM SERPL-SCNC: 139 MMOL/L
SPECIFIC GRAVITY URINE: 1.01
UROBILINOGEN URINE: NORMAL
WBC # FLD AUTO: 6.67 K/UL

## 2020-04-21 ENCOUNTER — RX RENEWAL (OUTPATIENT)
Age: 70
End: 2020-04-21

## 2020-05-30 ENCOUNTER — RX RENEWAL (OUTPATIENT)
Age: 70
End: 2020-05-30

## 2020-12-15 PROBLEM — J06.9 URI WITH COUGH AND CONGESTION: Status: RESOLVED | Noted: 2017-04-16 | Resolved: 2020-12-15

## 2021-04-21 ENCOUNTER — NON-APPOINTMENT (OUTPATIENT)
Age: 71
End: 2021-04-21

## 2021-05-04 ENCOUNTER — NON-APPOINTMENT (OUTPATIENT)
Age: 71
End: 2021-05-04

## 2021-05-04 ENCOUNTER — LABORATORY RESULT (OUTPATIENT)
Age: 71
End: 2021-05-04

## 2021-05-04 ENCOUNTER — APPOINTMENT (OUTPATIENT)
Dept: INTERNAL MEDICINE | Facility: CLINIC | Age: 71
End: 2021-05-04
Payer: MEDICARE

## 2021-05-04 VITALS
DIASTOLIC BLOOD PRESSURE: 75 MMHG | TEMPERATURE: 97.8 F | SYSTOLIC BLOOD PRESSURE: 109 MMHG | WEIGHT: 135 LBS | HEIGHT: 62 IN | OXYGEN SATURATION: 98 % | HEART RATE: 105 BPM | BODY MASS INDEX: 24.84 KG/M2

## 2021-05-04 DIAGNOSIS — S83.241S OTHER TEAR OF MEDIAL MENISCUS, CURRENT INJURY, RIGHT KNEE, SEQUELA: ICD-10-CM

## 2021-05-04 PROCEDURE — 36415 COLL VENOUS BLD VENIPUNCTURE: CPT

## 2021-05-04 PROCEDURE — 99213 OFFICE O/P EST LOW 20 MIN: CPT | Mod: 25

## 2021-05-04 NOTE — HISTORY OF PRESENT ILLNESS
[No Pertinent Cardiac History] : no history of aortic stenosis, atrial fibrillation, coronary artery disease, recent myocardial infarction, or implantable device/pacemaker [No Pertinent Pulmonary History] : no history of asthma, COPD, sleep apnea, or smoking [No Adverse Anesthesia Reaction] : no adverse anesthesia reaction in self or family member [Chronic Anticoagulation] : no chronic anticoagulation [Chronic Kidney Disease] : no chronic kidney disease [Diabetes] : no diabetes [FreeTextEntry1] : Surgery right knee [FreeTextEntry2] : 05/12/2021 [FreeTextEntry3] : Dr Rajiv Peacock [FreeTextEntry4] : Medication with change except Pristiq . Abilify\par Also  sweats at night

## 2021-05-05 LAB
ALBUMIN SERPL ELPH-MCNC: 4.7 G/DL
ALP BLD-CCNC: 61 U/L
ALT SERPL-CCNC: 13 U/L
ANION GAP SERPL CALC-SCNC: 13 MMOL/L
APPEARANCE: ABNORMAL
AST SERPL-CCNC: 26 U/L
BASOPHILS # BLD AUTO: 0.06 K/UL
BASOPHILS NFR BLD AUTO: 0.9 %
BILIRUB SERPL-MCNC: 0.4 MG/DL
BILIRUBIN URINE: NEGATIVE
BLOOD URINE: NORMAL
BUN SERPL-MCNC: 18 MG/DL
CALCIUM SERPL-MCNC: 9.3 MG/DL
CHLORIDE SERPL-SCNC: 102 MMOL/L
CO2 SERPL-SCNC: 23 MMOL/L
COLOR: YELLOW
CREAT SERPL-MCNC: 0.77 MG/DL
EOSINOPHIL # BLD AUTO: 0.1 K/UL
EOSINOPHIL NFR BLD AUTO: 1.5 %
GLUCOSE QUALITATIVE U: NEGATIVE
GLUCOSE SERPL-MCNC: 98 MG/DL
HCT VFR BLD CALC: 34.5 %
HGB BLD-MCNC: 11.6 G/DL
IMM GRANULOCYTES NFR BLD AUTO: 0.4 %
KETONES URINE: NEGATIVE
LEUKOCYTE ESTERASE URINE: ABNORMAL
LYMPHOCYTES # BLD AUTO: 2.39 K/UL
LYMPHOCYTES NFR BLD AUTO: 35.3 %
MAN DIFF?: NORMAL
MCHC RBC-ENTMCNC: 33.6 GM/DL
MCHC RBC-ENTMCNC: 34.1 PG
MCV RBC AUTO: 101.5 FL
MONOCYTES # BLD AUTO: 0.54 K/UL
MONOCYTES NFR BLD AUTO: 8 %
NEUTROPHILS # BLD AUTO: 3.66 K/UL
NEUTROPHILS NFR BLD AUTO: 53.9 %
NITRITE URINE: NEGATIVE
PH URINE: 6
PLATELET # BLD AUTO: 240 K/UL
POTASSIUM SERPL-SCNC: 4.1 MMOL/L
PROT SERPL-MCNC: 6.8 G/DL
PROTEIN URINE: NEGATIVE
RBC # BLD: 3.4 M/UL
RBC # FLD: 13.3 %
SODIUM SERPL-SCNC: 139 MMOL/L
SPECIFIC GRAVITY URINE: 1.01
UROBILINOGEN URINE: NORMAL
WBC # FLD AUTO: 6.78 K/UL

## 2021-05-07 ENCOUNTER — APPOINTMENT (OUTPATIENT)
Dept: INTERNAL MEDICINE | Facility: CLINIC | Age: 71
End: 2021-05-07
Payer: MEDICARE

## 2021-05-07 PROCEDURE — 36415 COLL VENOUS BLD VENIPUNCTURE: CPT

## 2021-05-08 LAB
APTT BLD: 29.4 SEC
INR PPP: 0.95 RATIO
PT BLD: 11.2 SEC

## 2021-08-28 ENCOUNTER — EMERGENCY (EMERGENCY)
Facility: HOSPITAL | Age: 71
LOS: 1 days | Discharge: HOME | End: 2021-08-28
Attending: EMERGENCY MEDICINE | Admitting: EMERGENCY MEDICINE
Payer: MEDICARE

## 2021-08-28 VITALS
SYSTOLIC BLOOD PRESSURE: 159 MMHG | TEMPERATURE: 98 F | HEART RATE: 66 BPM | RESPIRATION RATE: 18 BRPM | WEIGHT: 134.92 LBS | HEIGHT: 63 IN | OXYGEN SATURATION: 98 % | DIASTOLIC BLOOD PRESSURE: 62 MMHG

## 2021-08-28 DIAGNOSIS — M54.5 LOW BACK PAIN: ICD-10-CM

## 2021-08-28 DIAGNOSIS — Z96.652 PRESENCE OF LEFT ARTIFICIAL KNEE JOINT: Chronic | ICD-10-CM

## 2021-08-28 DIAGNOSIS — R20.0 ANESTHESIA OF SKIN: ICD-10-CM

## 2021-08-28 DIAGNOSIS — Z88.0 ALLERGY STATUS TO PENICILLIN: ICD-10-CM

## 2021-08-28 DIAGNOSIS — Z98.890 OTHER SPECIFIED POSTPROCEDURAL STATES: Chronic | ICD-10-CM

## 2021-08-28 DIAGNOSIS — B02.9 ZOSTER WITHOUT COMPLICATIONS: ICD-10-CM

## 2021-08-28 PROCEDURE — 72100 X-RAY EXAM L-S SPINE 2/3 VWS: CPT | Mod: 26

## 2021-08-28 PROCEDURE — 99283 EMERGENCY DEPT VISIT LOW MDM: CPT | Mod: 25

## 2021-08-28 PROCEDURE — 72100 X-RAY EXAM L-S SPINE 2/3 VWS: CPT

## 2021-08-28 RX ORDER — VALACYCLOVIR 500 MG/1
1 TABLET, FILM COATED ORAL
Qty: 21 | Refills: 0
Start: 2021-08-28 | End: 2021-09-03

## 2021-08-28 NOTE — ED PROVIDER NOTE - IV ALTEPASE ADMIN HIDDEN
show Size Of Lesion In Cm (Optional): 0.3 Detail Level: Detailed X Size Of Lesion In Cm (Optional): 0

## 2021-08-28 NOTE — ED PROVIDER NOTE - PATIENT PORTAL LINK FT
You can access the FollowMyHealth Patient Portal offered by [facility] by registering at the following website: [email or web address]. By joining [facility]’s FollowMyHealth portal, you will also be able to view your health information using other applications (apps) compatible with our system.

## 2021-08-28 NOTE — ED ADULT TRIAGE NOTE - CHIEF COMPLAINT QUOTE
x 2 days of atraumatic L sided lower back pain. reports numbness to L thigh area. denies numbness to L lower leg. no loss of bowel/ bladder movements. no recent falls. no lifting heavy objects.

## 2021-08-28 NOTE — ED PROVIDER NOTE - NSFOLLOWUPINSTRUCTIONS_ED_ALL_ED_FT
ACUTE LOW BACK PAIN - [facility]) Instructions(ER/ED)           Acute Low Back Pain    WHAT YOU NEED TO KNOW:    Acute low back pain is sudden discomfort that lasts up to 6 weeks and makes activity difficult.    DISCHARGE INSTRUCTIONS:    Return to the emergency department if:   •You have severe pain.      •You have sudden stiffness and heaviness on both buttocks down to both legs.      •You have numbness or weakness in one leg, or pain in both legs.      •You have numbness in your genital area or across your lower back.      •You cannot control your urine or bowel movements.      Call your doctor if:   •You have a fever.      •You have pain at night or when you rest.      •Your pain does not get better with treatment.      •You have pain that worsens when you cough or sneeze.      •You suddenly feel something pop or snap in your back.      •You have questions or concerns about your condition or care.      Medicines: You may need any of the following:  •NSAIDs, such as ibuprofen, help decrease swelling, pain, and fever. This medicine is available with or without a doctor's order. NSAIDs can cause stomach bleeding or kidney problems in certain people. If you take blood thinner medicine, always ask your healthcare provider if NSAIDs are safe for you. Always read the medicine label and follow directions.      •Acetaminophen decreases pain and fever. It is available without a doctor's order. Ask how much to take and how often to take it. Follow directions. Read the labels of all other medicines you are using to see if they also contain acetaminophen, or ask your doctor or pharmacist. Acetaminophen can cause liver damage if not taken correctly. Do not use more than 4 grams (4,000 milligrams) total of acetaminophen in one day.       •Muscle relaxers decrease pain by relaxing the muscles in your lower spine.      •Prescription pain medicine may be given. Ask your healthcare provider how to take this medicine safely. Some prescription pain medicines contain acetaminophen. Do not take other medicines that contain acetaminophen without talking to your healthcare provider. Too much acetaminophen may cause liver damage. Prescription pain medicine may cause constipation. Ask your healthcare provider how to prevent or treat constipation.       Self-care:   •Stay active as much as you can without causing more pain. Bed rest could make your back pain worse. Start with some light exercises, such as walking. Avoid heavy lifting until your pain is gone. Ask for more information about the activities or exercises that are right for you.   Family Walking for Exercise           •Apply heat on your back for 20 to 30 minutes every 2 hours for as many days as directed. Heat helps decrease pain and muscle spasms. Alternate heat and ice.      •Apply ice on your back for 15 to 20 minutes every hour or as directed. Use an ice pack, or put crushed ice in a plastic bag. Cover it with a towel before you apply it to your skin. Ice helps prevent tissue damage and decreases swelling and pain.      Prevent acute low back pain:   •Use proper body mechanics. ?Bend at the hips and knees when you  objects. Do not bend from the waist. Use your leg muscles as you lift the load. Do not use your back. Keep the object close to your chest as you lift it. Try not to twist or lift anything above your waist.      ?Change your position often when you stand for long periods of time. Rest one foot on a small box or footrest, and then switch to the other foot often.      ?Try not to sit for long periods of time. When you do, sit in a straight-backed chair with your feet flat on the floor. Never reach, pull, or push while you are sitting.      •Do exercises that strengthen your back muscles. Warm up before you exercise. Ask your healthcare provider the best exercises for you.      •Maintain a healthy weight. Ask your healthcare provider what a healthy weight is for you. Ask him or her to help you create a weight loss plan if you are overweight.      Follow up with your doctor as directed: Return for a follow-up visit if you still have pain after 1 to 3 weeks of treatment. You may need to visit an orthopedist if your back pain lasts longer than 12 weeks. Write down your questions so you remember to ask them during your visits.       © Copyright [employer]/ 2021           back to top                          © Copyright [employer] 2021

## 2021-08-28 NOTE — ED PROVIDER NOTE - NEUROLOGICAL, MLM
Alert and oriented, no focal deficits, no motor or sensory deficits. L4-L5-S1 5/5 BL- min pain with movement/flexion

## 2021-08-28 NOTE — ED PROVIDER NOTE - OBJECTIVE STATEMENT
70 F co sharp L sided back pain radiating to groin with numbness  no meds taken pta, not sig worse with movement  no f/c no cough  did not note rash  mod severity  no weakness

## 2021-08-28 NOTE — ED PROVIDER NOTE - MDM PATIENT STATEMENT FOR ADDL TREATMENT
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

## 2021-08-28 NOTE — ED PROVIDER NOTE - CLINICAL SUMMARY MEDICAL DECISION MAKING FREE TEXT BOX
mild shingles like rash in area of pain- xrays- no acute changes- d/w PCP and PX - rec starting valtrex immediately- FU w Dr Samano in 2-3 days  explained ddx- herniated disc vs shingles- rash will reveal itself in 2-3 days if shingles

## 2021-08-30 RX ORDER — OXYCODONE HYDROCHLORIDE 15 MG/1
15 TABLET ORAL
Qty: 20 | Refills: 0 | Status: ACTIVE | COMMUNITY
Start: 2021-08-30 | End: 1900-01-01

## 2021-09-17 ENCOUNTER — APPOINTMENT (OUTPATIENT)
Dept: INTERNAL MEDICINE | Facility: CLINIC | Age: 71
End: 2021-09-17
Payer: MEDICARE

## 2021-09-17 VITALS
RESPIRATION RATE: 16 BRPM | WEIGHT: 135 LBS | DIASTOLIC BLOOD PRESSURE: 68 MMHG | TEMPERATURE: 97.8 F | HEART RATE: 79 BPM | SYSTOLIC BLOOD PRESSURE: 109 MMHG | OXYGEN SATURATION: 98 % | BODY MASS INDEX: 24.84 KG/M2 | HEIGHT: 62 IN

## 2021-09-17 DIAGNOSIS — B02.8 ZOSTER WITH OTHER COMPLICATIONS: ICD-10-CM

## 2021-09-17 DIAGNOSIS — K21.9 GASTRO-ESOPHAGEAL REFLUX DISEASE W/OUT ESOPHAGITIS: ICD-10-CM

## 2021-09-17 DIAGNOSIS — G25.81 RESTLESS LEGS SYNDROME: ICD-10-CM

## 2021-09-17 DIAGNOSIS — G95.20 UNSPECIFIED CORD COMPRESSION: ICD-10-CM

## 2021-09-17 PROCEDURE — 90662 IIV NO PRSV INCREASED AG IM: CPT

## 2021-09-17 PROCEDURE — 99213 OFFICE O/P EST LOW 20 MIN: CPT | Mod: 25

## 2021-09-17 PROCEDURE — G0008: CPT

## 2021-09-17 RX ORDER — DESVENLAFAXINE 100 MG/1
100 TABLET, EXTENDED RELEASE ORAL
Qty: 90 | Refills: 0 | Status: ACTIVE | COMMUNITY
Start: 2021-09-06

## 2021-09-17 RX ORDER — ARIPIPRAZOLE 2 MG/1
2 TABLET ORAL
Qty: 90 | Refills: 0 | Status: ACTIVE | COMMUNITY
Start: 2021-07-26

## 2021-09-17 RX ORDER — GLYCOPYRROLATE 1 MG/1
1 TABLET ORAL
Qty: 180 | Refills: 0 | Status: ACTIVE | COMMUNITY
Start: 2021-09-06

## 2021-09-17 RX ORDER — VALACYCLOVIR 1 G/1
1 TABLET, FILM COATED ORAL
Qty: 21 | Refills: 0 | Status: ACTIVE | COMMUNITY
Start: 2021-08-28

## 2021-09-17 NOTE — HEALTH RISK ASSESSMENT
[No falls in past year] : Patient reported no falls in the past year [0] : 2) Feeling down, depressed, or hopeless: Not at all (0) [] : No [[id number]] : 0

## 2022-02-25 RX ORDER — OXYCODONE AND ACETAMINOPHEN 5; 325 MG/1; MG/1
5-325 TABLET ORAL
Qty: 20 | Refills: 0 | Status: ACTIVE | COMMUNITY
Start: 2021-05-11 | End: 1900-01-01

## 2022-05-24 ENCOUNTER — RX RENEWAL (OUTPATIENT)
Age: 72
End: 2022-05-24

## 2022-09-21 ENCOUNTER — NON-APPOINTMENT (OUTPATIENT)
Age: 72
End: 2022-09-21

## 2022-09-22 ENCOUNTER — NON-APPOINTMENT (OUTPATIENT)
Age: 72
End: 2022-09-22

## 2022-09-22 ENCOUNTER — APPOINTMENT (OUTPATIENT)
Dept: INTERNAL MEDICINE | Facility: CLINIC | Age: 72
End: 2022-09-22

## 2022-09-22 VITALS
HEIGHT: 62 IN | DIASTOLIC BLOOD PRESSURE: 85 MMHG | SYSTOLIC BLOOD PRESSURE: 124 MMHG | BODY MASS INDEX: 23.92 KG/M2 | HEART RATE: 80 BPM | WEIGHT: 130 LBS | OXYGEN SATURATION: 99 % | RESPIRATION RATE: 14 BRPM

## 2022-09-22 DIAGNOSIS — Z01.818 ENCOUNTER FOR OTHER PREPROCEDURAL EXAMINATION: ICD-10-CM

## 2022-09-22 DIAGNOSIS — E78.5 HYPERLIPIDEMIA, UNSPECIFIED: ICD-10-CM

## 2022-09-22 DIAGNOSIS — M75.50 BURSITIS OF UNSPECIFIED SHOULDER: ICD-10-CM

## 2022-09-22 PROCEDURE — 90662 IIV NO PRSV INCREASED AG IM: CPT

## 2022-09-22 PROCEDURE — 93000 ELECTROCARDIOGRAM COMPLETE: CPT

## 2022-09-22 PROCEDURE — 99213 OFFICE O/P EST LOW 20 MIN: CPT | Mod: 25

## 2022-09-22 PROCEDURE — G0008: CPT

## 2022-09-22 PROCEDURE — 36415 COLL VENOUS BLD VENIPUNCTURE: CPT

## 2022-09-22 NOTE — HISTORY OF PRESENT ILLNESS
[No Pertinent Cardiac History] : no history of aortic stenosis, atrial fibrillation, coronary artery disease, recent myocardial infarction, or implantable device/pacemaker [No Pertinent Pulmonary History] : no history of asthma, COPD, sleep apnea, or smoking [Family Member] : no family member with adverse anesthesia reaction/sudden death [Self] : no previous adverse anesthesia reaction [Chronic Anticoagulation] : no chronic anticoagulation [Chronic Kidney Disease] : no chronic kidney disease [Diabetes] : no diabetes [FreeTextEntry1] : Rotator cuff  [FreeTextEntry2] : 02/272002 [FreeTextEntry3] : Dr Peacock [FreeTextEntry4] : Right shoulder injury ;\par For rotator cuff surgery Medication without change

## 2022-09-23 LAB
ALBUMIN SERPL ELPH-MCNC: 4.9 G/DL
ALP BLD-CCNC: 61 U/L
ALT SERPL-CCNC: 19 U/L
ANION GAP SERPL CALC-SCNC: 12 MMOL/L
APTT BLD: 29.1 SEC
AST SERPL-CCNC: 25 U/L
BASOPHILS # BLD AUTO: 0.06 K/UL
BASOPHILS NFR BLD AUTO: 1.2 %
BILIRUB SERPL-MCNC: 0.5 MG/DL
BUN SERPL-MCNC: 14 MG/DL
CALCIUM SERPL-MCNC: 9.6 MG/DL
CHLORIDE SERPL-SCNC: 102 MMOL/L
CO2 SERPL-SCNC: 26 MMOL/L
CREAT SERPL-MCNC: 0.76 MG/DL
EGFR: 85 ML/MIN/1.73M2
EOSINOPHIL # BLD AUTO: 0.13 K/UL
EOSINOPHIL NFR BLD AUTO: 2.5 %
GLUCOSE SERPL-MCNC: 87 MG/DL
HCT VFR BLD CALC: 34.6 %
HGB BLD-MCNC: 11.5 G/DL
IMM GRANULOCYTES NFR BLD AUTO: 0.2 %
INR PPP: 1.02 RATIO
LYMPHOCYTES # BLD AUTO: 1.81 K/UL
LYMPHOCYTES NFR BLD AUTO: 35.1 %
MAN DIFF?: NORMAL
MCHC RBC-ENTMCNC: 33.2 GM/DL
MCHC RBC-ENTMCNC: 33.5 PG
MCV RBC AUTO: 100.9 FL
MONOCYTES # BLD AUTO: 0.38 K/UL
MONOCYTES NFR BLD AUTO: 7.4 %
NEUTROPHILS # BLD AUTO: 2.77 K/UL
NEUTROPHILS NFR BLD AUTO: 53.6 %
PLATELET # BLD AUTO: 223 K/UL
POTASSIUM SERPL-SCNC: 4.4 MMOL/L
PROT SERPL-MCNC: 7.2 G/DL
PT BLD: 11.9 SEC
RBC # BLD: 3.43 M/UL
RBC # FLD: 13.4 %
SODIUM SERPL-SCNC: 140 MMOL/L
WBC # FLD AUTO: 5.16 K/UL

## 2022-12-09 ENCOUNTER — APPOINTMENT (OUTPATIENT)
Dept: INTERNAL MEDICINE | Facility: CLINIC | Age: 72
End: 2022-12-09

## 2022-12-09 VITALS
HEIGHT: 62 IN | BODY MASS INDEX: 23.92 KG/M2 | HEART RATE: 84 BPM | DIASTOLIC BLOOD PRESSURE: 81 MMHG | WEIGHT: 130 LBS | SYSTOLIC BLOOD PRESSURE: 125 MMHG | OXYGEN SATURATION: 98 % | TEMPERATURE: 98.4 F

## 2022-12-09 DIAGNOSIS — R59.9 ENLARGED LYMPH NODES, UNSPECIFIED: ICD-10-CM

## 2022-12-09 PROCEDURE — 99213 OFFICE O/P EST LOW 20 MIN: CPT

## 2022-12-09 RX ORDER — DOXYCYCLINE HYCLATE 100 MG/1
100 TABLET ORAL TWICE DAILY
Qty: 14 | Refills: 1 | Status: DISCONTINUED | COMMUNITY
Start: 2022-11-23 | End: 2022-12-08

## 2022-12-09 NOTE — HISTORY OF PRESENT ILLNESS
[FreeTextEntry8] : Patient presents with right swollen gland on neck and dysphagia for about a month. She reports finishing 2 rounds of Doxycycline 12/8 with minimal improvement. Denies fever, chills, rhinorrhea, cough, malaise, sputum, wheezing, nausea, and vomiting.

## 2022-12-09 NOTE — PHYSICAL EXAM
[Normal Sclera/Conjunctiva] : normal sclera/conjunctiva [EOMI] : extraocular movements intact [Normal Outer Ear/Nose] : the outer ears and nose were normal in appearance [Normal Oropharynx] : the oropharynx was normal [No Lymphadenopathy] : no lymphadenopathy [Supple] : supple [No Respiratory Distress] : no respiratory distress  [No Accessory Muscle Use] : no accessory muscle use [Clear to Auscultation] : lungs were clear to auscultation bilaterally [Normal] : normal rate, regular rhythm, normal S1 and S2 and no murmur heard [Coordination Grossly Intact] : coordination grossly intact [Normal Gait] : normal gait [Normal Affect] : the affect was normal [Normal Mood] : the mood was normal

## 2023-01-09 RX ORDER — SIMVASTATIN 20 MG/1
20 TABLET, FILM COATED ORAL DAILY
Qty: 30 | Refills: 5 | Status: ACTIVE | COMMUNITY
Start: 2019-05-10 | End: 1900-01-01

## 2023-02-10 RX ORDER — CLONAZEPAM 1 MG/1
1 TABLET ORAL
Qty: 60 | Refills: 0 | Status: ACTIVE | COMMUNITY
Start: 2018-10-17 | End: 1900-01-01

## 2023-02-10 RX ORDER — OXYCODONE 10 MG/1
10 TABLET ORAL EVERY 4 HOURS
Qty: 60 | Refills: 0 | Status: ACTIVE | COMMUNITY
Start: 2019-06-21 | End: 1900-01-01

## 2023-02-15 RX ORDER — ZOLPIDEM TARTRATE 10 MG/1
10 TABLET ORAL
Qty: 30 | Refills: 5 | Status: ACTIVE | COMMUNITY
Start: 2021-01-15 | End: 1900-01-01

## 2023-10-10 RX ORDER — ZOLPIDEM TARTRATE 10 MG/1
10 TABLET ORAL
Qty: 30 | Refills: 5 | Status: ACTIVE | COMMUNITY
Start: 2017-12-18 | End: 1900-01-01

## 2023-12-25 ENCOUNTER — RX RENEWAL (OUTPATIENT)
Age: 73
End: 2023-12-25

## 2023-12-26 RX ORDER — SIMVASTATIN 20 MG/1
20 TABLET, FILM COATED ORAL DAILY
Qty: 90 | Refills: 3 | Status: ACTIVE | COMMUNITY
Start: 2019-05-13 | End: 1900-01-01

## 2024-06-27 NOTE — ASSESSMENT
[FreeTextEntry1] : No change in current regimen; Will repeat Lipid Panel today well developed, well nourished , in no acute distress , ambulating without difficulty , normal communication ability

## 2024-10-23 NOTE — ED PROVIDER NOTE - WR ORDER NAME 1
The patient has been re-examined and I agree with the above assessment or I updated with my findings. Xray Lumbar Spine AP + Lateral

## 2025-03-26 NOTE — ED ADULT NURSE NOTE - VOIDING
Blood work looks excellent across the board, no changes to medication dosing recommended without difficulty/no void